# Patient Record
Sex: FEMALE | NOT HISPANIC OR LATINO | URBAN - METROPOLITAN AREA
[De-identification: names, ages, dates, MRNs, and addresses within clinical notes are randomized per-mention and may not be internally consistent; named-entity substitution may affect disease eponyms.]

---

## 2023-02-16 ENCOUNTER — EMERGENCY (EMERGENCY)
Facility: HOSPITAL | Age: 73
LOS: 1 days | Discharge: HOME | End: 2023-02-16
Attending: EMERGENCY MEDICINE
Payer: MEDICARE

## 2023-02-16 VITALS
DIASTOLIC BLOOD PRESSURE: 78 MMHG | RESPIRATION RATE: 19 BRPM | OXYGEN SATURATION: 96 % | SYSTOLIC BLOOD PRESSURE: 112 MMHG | HEART RATE: 114 BPM | TEMPERATURE: 99 F | WEIGHT: 139.99 LBS | HEIGHT: 63 IN

## 2023-02-16 VITALS
SYSTOLIC BLOOD PRESSURE: 99 MMHG | DIASTOLIC BLOOD PRESSURE: 71 MMHG | RESPIRATION RATE: 17 BRPM | OXYGEN SATURATION: 95 % | HEART RATE: 89 BPM

## 2023-02-16 LAB
ALBUMIN SERPL ELPH-MCNC: 3 G/DL — LOW (ref 3.3–5)
ALP SERPL-CCNC: 84 U/L — SIGNIFICANT CHANGE UP (ref 40–120)
ALT FLD-CCNC: 9 U/L — LOW (ref 10–45)
ANION GAP SERPL CALC-SCNC: 12 MMOL/L — SIGNIFICANT CHANGE UP (ref 5–17)
AST SERPL-CCNC: 15 U/L — SIGNIFICANT CHANGE UP (ref 10–40)
BASOPHILS # BLD AUTO: 0.08 K/UL — SIGNIFICANT CHANGE UP (ref 0–0.2)
BASOPHILS NFR BLD AUTO: 0.5 % — SIGNIFICANT CHANGE UP (ref 0–2)
BILIRUB SERPL-MCNC: 0.3 MG/DL — SIGNIFICANT CHANGE UP (ref 0.2–1.2)
BUN SERPL-MCNC: 14 MG/DL — SIGNIFICANT CHANGE UP (ref 7–23)
CALCIUM SERPL-MCNC: 9 MG/DL — SIGNIFICANT CHANGE UP (ref 8.4–10.5)
CHLORIDE SERPL-SCNC: 101 MMOL/L — SIGNIFICANT CHANGE UP (ref 96–108)
CO2 SERPL-SCNC: 25 MMOL/L — SIGNIFICANT CHANGE UP (ref 22–31)
CREAT SERPL-MCNC: 0.74 MG/DL — SIGNIFICANT CHANGE UP (ref 0.5–1.3)
EGFR: 85 ML/MIN/1.73M2 — SIGNIFICANT CHANGE UP
EOSINOPHIL # BLD AUTO: 0.22 K/UL — SIGNIFICANT CHANGE UP (ref 0–0.5)
EOSINOPHIL NFR BLD AUTO: 1.5 % — SIGNIFICANT CHANGE UP (ref 0–6)
FLUAV AG NPH QL: SIGNIFICANT CHANGE UP
FLUBV AG NPH QL: SIGNIFICANT CHANGE UP
GLUCOSE SERPL-MCNC: 113 MG/DL — HIGH (ref 70–99)
HCT VFR BLD CALC: 40.6 % — SIGNIFICANT CHANGE UP (ref 34.5–45)
HGB BLD-MCNC: 12.7 G/DL — SIGNIFICANT CHANGE UP (ref 11.5–15.5)
IMM GRANULOCYTES NFR BLD AUTO: 0.9 % — SIGNIFICANT CHANGE UP (ref 0–0.9)
LYMPHOCYTES # BLD AUTO: 1.05 K/UL — SIGNIFICANT CHANGE UP (ref 1–3.3)
LYMPHOCYTES # BLD AUTO: 7.2 % — LOW (ref 13–44)
MCHC RBC-ENTMCNC: 26.7 PG — LOW (ref 27–34)
MCHC RBC-ENTMCNC: 31.3 GM/DL — LOW (ref 32–36)
MCV RBC AUTO: 85.5 FL — SIGNIFICANT CHANGE UP (ref 80–100)
MONOCYTES # BLD AUTO: 1.18 K/UL — HIGH (ref 0–0.9)
MONOCYTES NFR BLD AUTO: 8 % — SIGNIFICANT CHANGE UP (ref 2–14)
NEUTROPHILS # BLD AUTO: 12.02 K/UL — HIGH (ref 1.8–7.4)
NEUTROPHILS NFR BLD AUTO: 81.9 % — HIGH (ref 43–77)
NRBC # BLD: 0 /100 WBCS — SIGNIFICANT CHANGE UP (ref 0–0)
PLATELET # BLD AUTO: 591 K/UL — HIGH (ref 150–400)
POTASSIUM SERPL-MCNC: 4.3 MMOL/L — SIGNIFICANT CHANGE UP (ref 3.5–5.3)
POTASSIUM SERPL-SCNC: 4.3 MMOL/L — SIGNIFICANT CHANGE UP (ref 3.5–5.3)
PROT SERPL-MCNC: 7.6 G/DL — SIGNIFICANT CHANGE UP (ref 6–8.3)
RBC # BLD: 4.75 M/UL — SIGNIFICANT CHANGE UP (ref 3.8–5.2)
RBC # FLD: 13.2 % — SIGNIFICANT CHANGE UP (ref 10.3–14.5)
RSV RNA NPH QL NAA+NON-PROBE: SIGNIFICANT CHANGE UP
SARS-COV-2 RNA SPEC QL NAA+PROBE: SIGNIFICANT CHANGE UP
SODIUM SERPL-SCNC: 138 MMOL/L — SIGNIFICANT CHANGE UP (ref 135–145)
WBC # BLD: 14.68 K/UL — HIGH (ref 3.8–10.5)
WBC # FLD AUTO: 14.68 K/UL — HIGH (ref 3.8–10.5)

## 2023-02-16 PROCEDURE — 71046 X-RAY EXAM CHEST 2 VIEWS: CPT | Mod: 26

## 2023-02-16 PROCEDURE — 99284 EMERGENCY DEPT VISIT MOD MDM: CPT | Mod: 25

## 2023-02-16 PROCEDURE — 85025 COMPLETE CBC W/AUTO DIFF WBC: CPT

## 2023-02-16 PROCEDURE — 36415 COLL VENOUS BLD VENIPUNCTURE: CPT

## 2023-02-16 PROCEDURE — 87637 SARSCOV2&INF A&B&RSV AMP PRB: CPT

## 2023-02-16 PROCEDURE — 80053 COMPREHEN METABOLIC PANEL: CPT

## 2023-02-16 PROCEDURE — 71046 X-RAY EXAM CHEST 2 VIEWS: CPT

## 2023-02-16 PROCEDURE — 99284 EMERGENCY DEPT VISIT MOD MDM: CPT

## 2023-02-16 RX ORDER — IBUPROFEN 200 MG
400 TABLET ORAL ONCE
Refills: 0 | Status: COMPLETED | OUTPATIENT
Start: 2023-02-16 | End: 2023-02-16

## 2023-02-16 RX ORDER — AZITHROMYCIN 500 MG/1
1 TABLET, FILM COATED ORAL
Qty: 4 | Refills: 0
Start: 2023-02-16 | End: 2023-02-19

## 2023-02-16 RX ORDER — ONDANSETRON 8 MG/1
4 TABLET, FILM COATED ORAL ONCE
Refills: 0 | Status: DISCONTINUED | OUTPATIENT
Start: 2023-02-16 | End: 2023-02-20

## 2023-02-16 RX ORDER — AZITHROMYCIN 500 MG/1
500 TABLET, FILM COATED ORAL ONCE
Refills: 0 | Status: COMPLETED | OUTPATIENT
Start: 2023-02-16 | End: 2023-02-16

## 2023-02-16 RX ORDER — ACETAMINOPHEN 500 MG
975 TABLET ORAL ONCE
Refills: 0 | Status: COMPLETED | OUTPATIENT
Start: 2023-02-16 | End: 2023-02-16

## 2023-02-16 RX ADMIN — Medication 975 MILLIGRAM(S): at 19:43

## 2023-02-16 RX ADMIN — Medication 1 TABLET(S): at 21:28

## 2023-02-16 RX ADMIN — Medication 400 MILLIGRAM(S): at 17:29

## 2023-02-16 RX ADMIN — Medication 400 MILLIGRAM(S): at 19:43

## 2023-02-16 RX ADMIN — Medication 975 MILLIGRAM(S): at 17:29

## 2023-02-16 RX ADMIN — AZITHROMYCIN 500 MILLIGRAM(S): 500 TABLET, FILM COATED ORAL at 21:29

## 2023-02-16 NOTE — ED PROVIDER NOTE - PATIENT PORTAL LINK FT
You can access the FollowMyHealth Patient Portal offered by [facility] by registering at the following website: [email or web address]. By joining [employer]’s FollowMyHealth portal, you will also be able to view your health information using other applications (apps) compatible with our system.

## 2023-02-16 NOTE — ED PROVIDER NOTE - OBJECTIVE STATEMENT
This is a 72-year-old female who is coming in with cough and fever To 101. Productive phlegm with chest congestion.  No chest pain.  No pleuritic symptoms.  No abdominal pain.  She feels like his upper respiratory tract infection but nothing is helping.

## 2023-02-16 NOTE — ED PROVIDER NOTE - NSFOLLOWUPINSTRUCTIONS_ED_ALL_ED_FT
IMPORTANT INSTRUCTIONS FROM Dr. LÓPEZ:    Please follow up with your personal medical doctor in 24-48 hours.   Bring results from today to your visit.    If you were advised to take any medications - be sure to review the package insert.    If your symptoms change, get worse or if you have any new symptoms, come to the ER right away.  If you have any questions, call the ER at the phone number on this page.

## 2023-02-16 NOTE — ED PROVIDER NOTE - CLINICAL SUMMARY MEDICAL DECISION MAKING FREE TEXT BOX
Chest x-ray is consistent with pneumonia On the right-hand side at the heart border to my independent interpretation.  Basic blood work with mild leukocytosis.  Patient has a low curb 65 score and I discussed with her inpatient versus outpatient management of her pneumonia.  Patient strongly has a preference against being admitted.  Antibiotics prescribed with first dose in the ER.  Patient vital signs rechecked and improved. I discussed with the patient return precautions and expectations for her illness.

## 2023-02-16 NOTE — ED PROVIDER NOTE - RAPID ASSESSMENT
Patient presents with chief complaint of 3 weeks of cough without phlegm, Tmax 101.9 three days ago. Patient did not take anything prior to arrival. No chest pain. Patient presents with chief complaint of 3 weeks of cough without phlegm, Tmax 101.9 three days ago. Patient did not take anything prior to arrival. No chest pain.  Carlton Welch MD, FACEP: I saw the patient waiting area; a brief history was taken and a thorough physical exam was not performed as there is no physical exam room available.  The patient will be seen and further worked up in the main emergency department and their care will be completed by the main emergency department team.  I was not involved in this patient's care after the QDOC process, and unless otherwise noted in the ED provider note, was not involved in their care during their ED course. Receiving team will follow up on labs, analgesia, any clinical imaging, reassess and disposition as clinically indicated, all decisions regarding the progression of care will be made at their discretion.

## 2023-02-16 NOTE — ED ADULT NURSE REASSESSMENT NOTE - NS ED NURSE REASSESS COMMENT FT1
Received patient from Alma RN, patient at AxOx4 mental status, able to make needs known, NAD, VSS, patient agreeable to plan of care, pending dispo. Pt resting in stretcher, denying any symptoms at this time, well appearing. Pt requesting food.

## 2023-02-16 NOTE — ED ADULT NURSE NOTE - OBJECTIVE STATEMENT
1828 pt 72yf aox4 c/o fever /cough/chills x 12 days states unable to get an early appt w/ dr julio as of this time, denies pmhx, able to verbalize concerns, pending eval

## 2023-02-16 NOTE — ED PROVIDER NOTE - PSYCHIATRIC, MLM
Alert and oriented to person, place, time/situation. normal mood and affect. no apparent risk to self or others. Nosebleeds Normal Treatment: I explained this is common when taking isotretinoin. I recommended saline mist in each nostril multiple times a day. If this worsens they will contact us.

## 2023-03-31 ENCOUNTER — INPATIENT (INPATIENT)
Facility: HOSPITAL | Age: 73
LOS: 12 days | Discharge: NURSING, REHAB OR LONG-TERM CARE | DRG: 871 | End: 2023-04-13
Attending: STUDENT IN AN ORGANIZED HEALTH CARE EDUCATION/TRAINING PROGRAM | Admitting: STUDENT IN AN ORGANIZED HEALTH CARE EDUCATION/TRAINING PROGRAM
Payer: MEDICARE

## 2023-03-31 VITALS
DIASTOLIC BLOOD PRESSURE: 64 MMHG | RESPIRATION RATE: 18 BRPM | HEART RATE: 130 BPM | HEIGHT: 63 IN | OXYGEN SATURATION: 94 % | WEIGHT: 134.92 LBS | TEMPERATURE: 98 F | SYSTOLIC BLOOD PRESSURE: 93 MMHG

## 2023-03-31 DIAGNOSIS — A41.9 SEPSIS, UNSPECIFIED ORGANISM: ICD-10-CM

## 2023-03-31 DIAGNOSIS — R77.8 OTHER SPECIFIED ABNORMALITIES OF PLASMA PROTEINS: ICD-10-CM

## 2023-03-31 DIAGNOSIS — J15.6 PNEUMONIA DUE TO OTHER GRAM-NEGATIVE BACTERIA: ICD-10-CM

## 2023-03-31 DIAGNOSIS — J18.9 PNEUMONIA, UNSPECIFIED ORGANISM: ICD-10-CM

## 2023-03-31 DIAGNOSIS — D75.839 THROMBOCYTOSIS, UNSPECIFIED: ICD-10-CM

## 2023-03-31 DIAGNOSIS — D64.9 ANEMIA, UNSPECIFIED: ICD-10-CM

## 2023-03-31 DIAGNOSIS — N17.9 ACUTE KIDNEY FAILURE, UNSPECIFIED: ICD-10-CM

## 2023-03-31 DIAGNOSIS — Z29.9 ENCOUNTER FOR PROPHYLACTIC MEASURES, UNSPECIFIED: ICD-10-CM

## 2023-03-31 PROBLEM — Z78.9 OTHER SPECIFIED HEALTH STATUS: Chronic | Status: ACTIVE | Noted: 2023-02-16

## 2023-03-31 LAB
ALBUMIN SERPL ELPH-MCNC: 2.4 G/DL — LOW (ref 3.3–5)
ALP SERPL-CCNC: 82 U/L — SIGNIFICANT CHANGE UP (ref 40–120)
ALT FLD-CCNC: 10 U/L — SIGNIFICANT CHANGE UP (ref 10–45)
APPEARANCE UR: CLEAR — SIGNIFICANT CHANGE UP
APTT BLD: 28.3 SEC — SIGNIFICANT CHANGE UP (ref 27.5–35.5)
AST SERPL-CCNC: 21 U/L — SIGNIFICANT CHANGE UP (ref 10–40)
BACTERIA # UR AUTO: NEGATIVE — SIGNIFICANT CHANGE UP
BASE EXCESS BLDV CALC-SCNC: 2.4 MMOL/L — SIGNIFICANT CHANGE UP (ref -2–3)
BASOPHILS # BLD AUTO: 0.07 K/UL — SIGNIFICANT CHANGE UP (ref 0–0.2)
BASOPHILS NFR BLD AUTO: 0.4 % — SIGNIFICANT CHANGE UP (ref 0–2)
BILIRUB SERPL-MCNC: 0.5 MG/DL — SIGNIFICANT CHANGE UP (ref 0.2–1.2)
BILIRUB UR-MCNC: NEGATIVE — SIGNIFICANT CHANGE UP
BUN SERPL-MCNC: 28 MG/DL — HIGH (ref 7–23)
CA-I SERPL-SCNC: 1.24 MMOL/L — SIGNIFICANT CHANGE UP (ref 1.15–1.33)
CALCIUM SERPL-MCNC: 9.2 MG/DL — SIGNIFICANT CHANGE UP (ref 8.4–10.5)
CHLORIDE BLDV-SCNC: 104 MMOL/L — SIGNIFICANT CHANGE UP (ref 96–108)
CHLORIDE SERPL-SCNC: 101 MMOL/L — SIGNIFICANT CHANGE UP (ref 96–108)
CO2 BLDV-SCNC: 28 MMOL/L — HIGH (ref 22–26)
CO2 SERPL-SCNC: 25 MMOL/L — SIGNIFICANT CHANGE UP (ref 22–31)
COLOR SPEC: YELLOW — SIGNIFICANT CHANGE UP
CREAT SERPL-MCNC: 1.58 MG/DL — HIGH (ref 0.5–1.3)
DIFF PNL FLD: ABNORMAL
EGFR: 34 ML/MIN/1.73M2 — LOW
EOSINOPHIL # BLD AUTO: 0.11 K/UL — SIGNIFICANT CHANGE UP (ref 0–0.5)
EOSINOPHIL NFR BLD AUTO: 0.6 % — SIGNIFICANT CHANGE UP (ref 0–6)
EPI CELLS # UR: 3 /HPF — SIGNIFICANT CHANGE UP
GAS PNL BLDV: 134 MMOL/L — LOW (ref 136–145)
GAS PNL BLDV: SIGNIFICANT CHANGE UP
GAS PNL BLDV: SIGNIFICANT CHANGE UP
GLUCOSE BLDV-MCNC: 147 MG/DL — HIGH (ref 70–99)
GLUCOSE SERPL-MCNC: 154 MG/DL — HIGH (ref 70–99)
GLUCOSE UR QL: NEGATIVE — SIGNIFICANT CHANGE UP
HCO3 BLDV-SCNC: 27 MMOL/L — SIGNIFICANT CHANGE UP (ref 22–29)
HCT VFR BLD CALC: 29.3 % — LOW (ref 34.5–45)
HCT VFR BLDA CALC: 29 % — LOW (ref 34.5–46.5)
HGB BLD CALC-MCNC: 9.6 G/DL — LOW (ref 11.7–16.1)
HGB BLD-MCNC: 9 G/DL — LOW (ref 11.5–15.5)
HYALINE CASTS # UR AUTO: 1 /LPF — SIGNIFICANT CHANGE UP (ref 0–2)
IMM GRANULOCYTES NFR BLD AUTO: 1.5 % — HIGH (ref 0–0.9)
INR BLD: 1.27 RATIO — HIGH (ref 0.88–1.16)
KETONES UR-MCNC: NEGATIVE — SIGNIFICANT CHANGE UP
LACTATE BLDV-MCNC: 2.1 MMOL/L — HIGH (ref 0.5–2)
LACTATE SERPL-SCNC: 1.2 MMOL/L — SIGNIFICANT CHANGE UP (ref 0.5–2)
LACTATE SERPL-SCNC: 1.8 MMOL/L — SIGNIFICANT CHANGE UP (ref 0.5–2)
LEUKOCYTE ESTERASE UR-ACNC: NEGATIVE — SIGNIFICANT CHANGE UP
LYMPHOCYTES # BLD AUTO: 0.97 K/UL — LOW (ref 1–3.3)
LYMPHOCYTES # BLD AUTO: 5.3 % — LOW (ref 13–44)
MCHC RBC-ENTMCNC: 24.3 PG — LOW (ref 27–34)
MCHC RBC-ENTMCNC: 30.7 GM/DL — LOW (ref 32–36)
MCV RBC AUTO: 79.2 FL — LOW (ref 80–100)
MONOCYTES # BLD AUTO: 1.38 K/UL — HIGH (ref 0–0.9)
MONOCYTES NFR BLD AUTO: 7.5 % — SIGNIFICANT CHANGE UP (ref 2–14)
NEUTROPHILS # BLD AUTO: 15.5 K/UL — HIGH (ref 1.8–7.4)
NEUTROPHILS NFR BLD AUTO: 84.7 % — HIGH (ref 43–77)
NITRITE UR-MCNC: NEGATIVE — SIGNIFICANT CHANGE UP
NRBC # BLD: 0 /100 WBCS — SIGNIFICANT CHANGE UP (ref 0–0)
PCO2 BLDV: 42 MMHG — SIGNIFICANT CHANGE UP (ref 39–42)
PH BLDV: 7.42 — SIGNIFICANT CHANGE UP (ref 7.32–7.43)
PH UR: 7 — SIGNIFICANT CHANGE UP (ref 5–8)
PLATELET # BLD AUTO: 663 K/UL — HIGH (ref 150–400)
PO2 BLDV: 31 MMHG — SIGNIFICANT CHANGE UP (ref 25–45)
POTASSIUM BLDV-SCNC: 4.8 MMOL/L — SIGNIFICANT CHANGE UP (ref 3.5–5.1)
POTASSIUM SERPL-MCNC: 4.8 MMOL/L — SIGNIFICANT CHANGE UP (ref 3.5–5.3)
POTASSIUM SERPL-SCNC: 4.8 MMOL/L — SIGNIFICANT CHANGE UP (ref 3.5–5.3)
PROT SERPL-MCNC: 6.8 G/DL — SIGNIFICANT CHANGE UP (ref 6–8.3)
PROT UR-MCNC: ABNORMAL
PROTHROM AB SERPL-ACNC: 14.6 SEC — HIGH (ref 10.5–13.4)
RAPID RVP RESULT: SIGNIFICANT CHANGE UP
RBC # BLD: 3.7 M/UL — LOW (ref 3.8–5.2)
RBC # FLD: 15.4 % — HIGH (ref 10.3–14.5)
RBC CASTS # UR COMP ASSIST: 8 /HPF — HIGH (ref 0–4)
SAO2 % BLDV: 48.7 % — LOW (ref 67–88)
SARS-COV-2 RNA SPEC QL NAA+PROBE: SIGNIFICANT CHANGE UP
SODIUM SERPL-SCNC: 137 MMOL/L — SIGNIFICANT CHANGE UP (ref 135–145)
SP GR SPEC: 1.01 — SIGNIFICANT CHANGE UP (ref 1.01–1.02)
TROPONIN T, HIGH SENSITIVITY RESULT: 81 NG/L — HIGH (ref 0–51)
TROPONIN T, HIGH SENSITIVITY RESULT: 95 NG/L — HIGH (ref 0–51)
UROBILINOGEN FLD QL: NEGATIVE — SIGNIFICANT CHANGE UP
WBC # BLD: 18.31 K/UL — HIGH (ref 3.8–10.5)
WBC # FLD AUTO: 18.31 K/UL — HIGH (ref 3.8–10.5)
WBC UR QL: 12 /HPF — HIGH (ref 0–5)

## 2023-03-31 PROCEDURE — 71045 X-RAY EXAM CHEST 1 VIEW: CPT | Mod: 26

## 2023-03-31 PROCEDURE — 99223 1ST HOSP IP/OBS HIGH 75: CPT

## 2023-03-31 PROCEDURE — 71260 CT THORAX DX C+: CPT | Mod: 26,MG

## 2023-03-31 PROCEDURE — G1004: CPT

## 2023-03-31 PROCEDURE — 99285 EMERGENCY DEPT VISIT HI MDM: CPT | Mod: CS

## 2023-03-31 RX ORDER — ENOXAPARIN SODIUM 100 MG/ML
40 INJECTION SUBCUTANEOUS EVERY 24 HOURS
Refills: 0 | Status: DISCONTINUED | OUTPATIENT
Start: 2023-03-31 | End: 2023-04-01

## 2023-03-31 RX ORDER — CHOLECALCIFEROL (VITAMIN D3) 125 MCG
1 CAPSULE ORAL
Refills: 0 | DISCHARGE

## 2023-03-31 RX ORDER — ACETAMINOPHEN 500 MG
650 TABLET ORAL EVERY 6 HOURS
Refills: 0 | Status: DISCONTINUED | OUTPATIENT
Start: 2023-03-31 | End: 2023-04-13

## 2023-03-31 RX ORDER — CEFTRIAXONE 500 MG/1
1000 INJECTION, POWDER, FOR SOLUTION INTRAMUSCULAR; INTRAVENOUS ONCE
Refills: 0 | Status: COMPLETED | OUTPATIENT
Start: 2023-03-31 | End: 2023-03-31

## 2023-03-31 RX ORDER — SODIUM CHLORIDE 9 MG/ML
1900 INJECTION, SOLUTION INTRAVENOUS ONCE
Refills: 0 | Status: COMPLETED | OUTPATIENT
Start: 2023-03-31 | End: 2023-03-31

## 2023-03-31 RX ORDER — PIPERACILLIN AND TAZOBACTAM 4; .5 G/20ML; G/20ML
3.38 INJECTION, POWDER, LYOPHILIZED, FOR SOLUTION INTRAVENOUS EVERY 8 HOURS
Refills: 0 | Status: COMPLETED | OUTPATIENT
Start: 2023-04-01 | End: 2023-04-06

## 2023-03-31 RX ORDER — PIPERACILLIN AND TAZOBACTAM 4; .5 G/20ML; G/20ML
3.38 INJECTION, POWDER, LYOPHILIZED, FOR SOLUTION INTRAVENOUS ONCE
Refills: 0 | Status: COMPLETED | OUTPATIENT
Start: 2023-03-31 | End: 2023-03-31

## 2023-03-31 RX ORDER — SODIUM CHLORIDE 9 MG/ML
1000 INJECTION, SOLUTION INTRAVENOUS
Refills: 0 | Status: DISCONTINUED | OUTPATIENT
Start: 2023-03-31 | End: 2023-04-02

## 2023-03-31 RX ADMIN — SODIUM CHLORIDE 1900 MILLILITER(S): 9 INJECTION, SOLUTION INTRAVENOUS at 03:55

## 2023-03-31 RX ADMIN — ENOXAPARIN SODIUM 40 MILLIGRAM(S): 100 INJECTION SUBCUTANEOUS at 17:55

## 2023-03-31 RX ADMIN — SODIUM CHLORIDE 60 MILLILITER(S): 9 INJECTION, SOLUTION INTRAVENOUS at 18:16

## 2023-03-31 RX ADMIN — CEFTRIAXONE 100 MILLIGRAM(S): 500 INJECTION, POWDER, FOR SOLUTION INTRAMUSCULAR; INTRAVENOUS at 04:50

## 2023-03-31 RX ADMIN — PIPERACILLIN AND TAZOBACTAM 25 GRAM(S): 4; .5 INJECTION, POWDER, LYOPHILIZED, FOR SOLUTION INTRAVENOUS at 20:52

## 2023-03-31 RX ADMIN — PIPERACILLIN AND TAZOBACTAM 200 GRAM(S): 4; .5 INJECTION, POWDER, LYOPHILIZED, FOR SOLUTION INTRAVENOUS at 17:55

## 2023-03-31 NOTE — H&P ADULT - CONVERSATION DETAILS
Pt would designate her domestic partner Robert Alex as her HCP  She also says she would like to be full code

## 2023-03-31 NOTE — H&P ADULT - PROBLEM SELECTOR PLAN 5
No hx of bleeding, but previous hgb 12.7 last month  Check iron studies, reticulocyte count, B12 and folic acid (doesn't eat meat but supplements)

## 2023-03-31 NOTE — ED PROVIDER NOTE - CARE PLAN
Principal Discharge DX:	RLL pneumonia   1 Principal Discharge DX:	RLL pneumonia  Secondary Diagnosis:	MINERVA (acute kidney injury)

## 2023-03-31 NOTE — H&P ADULT - NSHPPHYSICALEXAM_GEN_ALL_CORE
ICU Vital Signs Last 24 Hrs  T(C): 36.8 (31 Mar 2023 15:38), Max: 37.1 (31 Mar 2023 07:18)  T(F): 98.2 (31 Mar 2023 15:38), Max: 98.7 (31 Mar 2023 07:18)  HR: 96 (31 Mar 2023 15:38) (84 - 130)  BP: 102/71 (31 Mar 2023 15:38) (93/64 - 123/81)  BP(mean): 93 (31 Mar 2023 07:18) (80 - 93)  ABP: --  ABP(mean): --  RR: 18 (31 Mar 2023 15:38) (17 - 19)  SpO2: 97% (31 Mar 2023 15:38) (94% - 100%)    O2 Parameters below as of 31 Mar 2023 15:38  Patient On (Oxygen Delivery Method): room air

## 2023-03-31 NOTE — H&P ADULT - PROBLEM SELECTOR PLAN 1
2/2 PNA, repeat lactate cleared, cultures pending  Continue abx for resistant organism given abx use within last 90 days  Sputum culture

## 2023-03-31 NOTE — ED PROVIDER NOTE - ATTENDING CONTRIBUTION TO CARE
I, Adonis Ospina, performed a history and physical exam of the patient and discussed their management with the resident, student, and/or fellow. I reviewed the note and agree with the documented findings and plan of care. I was present and available for all procedures.

## 2023-03-31 NOTE — ED ADULT TRIAGE NOTE - CHIEF COMPLAINT QUOTE
patient diagnosed with pneumonia 6 weeks ago, patient still endorsing cough, fevers, chills, weakness, fatigue, and decreased po intake

## 2023-03-31 NOTE — ED PROVIDER NOTE - CLINICAL SUMMARY MEDICAL DECISION MAKING FREE TEXT BOX
Jose C, PGY2 - 72-year-old woman with prior pneumonia, now post antibiotics, presenting due to acute worsening of symptoms most prominently weakness. Patient was tachycardic and hypotensive at triage, although she says that her BP is low at baseline. Sepsis labs, chest CT, reassess. concern for persistent pneumonia versus malignancy vs necrosis. *The above represents an initial assessment/impression. Please refer to progress notes for potential changes in patient clinical course* Jose C, PGY2 - 72-year-old woman with prior pneumonia, now post antibiotics, presenting due to acute worsening of symptoms most prominently weakness. Patient was tachycardic and hypotensive at triage, although she says that her BP is low at baseline. Sepsis labs, chest CT, reassess. concern for persistent pneumonia versus malignancy vs necrosis. *The above represents an initial assessment/impression. Please refer to progress notes for potential changes in patient clinical course*    Adonis Ospina MD (Attending Physician): 72 year old female with recent diagnosis of pneumonia presents for intermittent fevers, weakness, cough x 6 weeks. Pt here prior for similar complaints, diagnosed with pneumonia and discharged with abx. Since then, she has been increasingly more fatigue and not getting up. Denies cp, n/v/d, abd pain. Reports wet cough with sputum as well as SOB. States she has "low blood pressure" but patient was in the 116s systolic on last visit. No other known diagnoses. On exam, cooperative, sox4. CVS tachycardic. Lungs: CTAB. Abd soft and nontender. MSK: No leg edema or erythema. Afebrile. Differentials include but are not limited to: viral illness, effusion, empyema, viral URI, pneumonia, pulmonary embolism. plan for cardiac workup, CT chest and will admit. patient hypotensive, tachycardic here and sepsis workup initiated with fluids.

## 2023-03-31 NOTE — H&P ADULT - PROBLEM SELECTOR PLAN 3
No hx of this  Check peripheral smear in am if persists after hydration  Should improve with hydration, doubt primary thrombocytosis pt has no previous hx of this

## 2023-03-31 NOTE — ED ADULT NURSE NOTE - ED STAT RN HANDOFF DETAILS
Assumed pt care from previous RNWanda.  Pt alert and oriented, stretcher side rails up, and in lowest locked position.  Safety maintained.

## 2023-03-31 NOTE — ED PROVIDER NOTE - NS ED ROS FT
GENERAL: No fever, no chills  EYES: No change in vision  HEENT: No trouble swallowing or speaking  CARDIAC: No chest pain  PULMONARY: +cough, no SOB  GI: No abdominal pain, no nausea, no vomiting, no diarrhea, no constipation  : No changes in urination  SKIN: No rashes  NEURO: No headache, no numbness  MSK: No joint pain  Otherwise as HPI or negative.

## 2023-03-31 NOTE — ED ADULT NURSE NOTE - OBJECTIVE STATEMENT
72y Female AOx4 with no PMH presents to the ED c/o worsening weakness. Pt states she was dx pneumonia x6 weeks ago, was prescribed Augmentin and Azithromycin, completed both courses. Endorsed slight improvement in cough. Now presenting for worsening cough, weakness and dyspnea on exertion. Reports difficulty ambulating, independent at baseline. Placed on cardiac monitor - sinus tachycardia. Denies N/V, fever/chills, SOB, chest pain. Spontaneous/unlabored respirations, speaking in full sentences. Side rails up, bed in lowest position, oriented to call bell, safety maintained.

## 2023-03-31 NOTE — ED PROVIDER NOTE - OBJECTIVE STATEMENT
72-year-old woman with PMH of pneumonia diagnosed 6 weeks ago, presenting due to acute worsening of weakness.  Patient states that since her pneumonia diagnosis and subsequent augmentin and azithromax, she has felt an improvement in cough, chills, weakness, however has not felt back to baseline.  Took antibiotics for a total of 5 days. Over the last couple of days patient has had worsening of her weakness, states that she needs to lean onto a person or furniture for her to ambulate.  Denies chest pain, shortness of breath.

## 2023-03-31 NOTE — ED PROVIDER NOTE - PHYSICAL EXAMINATION
Gen: NAD, AOx3, able to make needs known, non-toxic  Head: NCAT  HEENT: EOMI, normal conjunctiva  Lung: CTAB, no respiratory distress, no wheezes/rhonchi/rales B/L, speaking in full sentences  CV: RRR, +murmur, pulses bilaterally   Abd: soft, NTND, no guarding, no CVA tenderness  MSK: no visible bony deformities  Neuro: No focal sensory or motor deficits  Skin: Warm, well perfused, no rash  Psych: normal affect

## 2023-03-31 NOTE — H&P ADULT - NSHPLABSRESULTS_GEN_ALL_CORE
9.0    18.31 )-----------( 663      ( 31 Mar 2023 03:24 )             29.3     31 Mar 2023 03:24    137    |  101    |  28     ----------------------------<  154    4.8     |  25     |  1.58     Ca    9.2        31 Mar 2023 03:24    TPro  6.8    /  Alb  2.4    /  TBili  0.5    /  DBili  x      /  AST  21     /  ALT  10     /  AlkPhos  82     31 Mar 2023 03:24    LIVER FUNCTIONS - ( 31 Mar 2023 03:24 )  Alb: 2.4 g/dL / Pro: 6.8 g/dL / ALK PHOS: 82 U/L / ALT: 10 U/L / AST: 21 U/L / GGT: x           PT/INR - ( 31 Mar 2023 03:24 )   PT: 14.6 sec;   INR: 1.27 ratio         PTT - ( 31 Mar 2023 03:24 )  PTT:28.3 sec  CAPILLARY BLOOD GLUCOSE            Urinalysis Basic - ( 31 Mar 2023 05:31 )    Color: Yellow / Appearance: Clear / S.012 / pH: x  Gluc: x / Ketone: Negative  / Bili: Negative / Urobili: Negative   Blood: x / Protein: 30 mg/dL / Nitrite: Negative   Leuk Esterase: Negative / RBC: 8 /hpf / WBC 12 /HPF   Sq Epi: x / Non Sq Epi: 3 /hpf / Bacteria: Negative

## 2023-03-31 NOTE — H&P ADULT - NSICDXNOPASTMEDICALHX_GEN_ALL_CORE
<-- Click to add NO pertinent Past Medical History
Alert and oriented to person, place and time, memory intact, behavior appropriate to situation, PERRL.

## 2023-03-31 NOTE — ED ADULT TRIAGE NOTE - BSA (M2)
Detail Level: Zone Plan: Location: Chest and Back\\nTreatment: advised patient if bothersome can be zapped 1.64

## 2023-03-31 NOTE — H&P ADULT - PROBLEM SELECTOR PLAN 4
Downtrended but delta change is 15  Pt also complaining of SOB and weakness so will check TTE  EKG sinus tachycardia no ishemic changes per ED documentation, will repeat

## 2023-03-31 NOTE — H&P ADULT - HISTORY OF PRESENT ILLNESS
72F no significant past medical history except for recent pneumonia treated with PO abx.  She says that she was treated with Augmentin and Zithromax and abx finished several weeks ago. Since that time she has continued to feel weak, rarely getting out of bed or leaving the house and persistent chills predominately at night.  Her cough as continued, again moreso at night.  She also denies any history of anemia or noted any bleeding, no hx of thrombocytosis or renal disease.  She lives with a domestic partner of 20+ years.  Takes vitamins but no medications daily.  No chest pain.

## 2023-03-31 NOTE — ED PROVIDER NOTE - PROGRESS NOTE DETAILS
Jose C, PGY2 - cxr and ct chest consistent with pneumonia. troponin elevated in the context of new MINERVA, will send repeat, patient denying chest pain and EKG shows no ST elevations with reciprocal depressions. repeat troponin to be sent now. Hospitalist rosetta accepting patient for pneumonia.

## 2023-04-01 LAB
ANION GAP SERPL CALC-SCNC: 10 MMOL/L — SIGNIFICANT CHANGE UP (ref 5–17)
BASOPHILS # BLD AUTO: 0.28 K/UL — HIGH (ref 0–0.2)
BASOPHILS NFR BLD AUTO: 1.7 % — SIGNIFICANT CHANGE UP (ref 0–2)
BLD GP AB SCN SERPL QL: NEGATIVE — SIGNIFICANT CHANGE UP
BUN SERPL-MCNC: 23 MG/DL — SIGNIFICANT CHANGE UP (ref 7–23)
CALCIUM SERPL-MCNC: 8.2 MG/DL — LOW (ref 8.4–10.5)
CHLORIDE SERPL-SCNC: 103 MMOL/L — SIGNIFICANT CHANGE UP (ref 96–108)
CO2 SERPL-SCNC: 25 MMOL/L — SIGNIFICANT CHANGE UP (ref 22–31)
CREAT ?TM UR-MCNC: 82 MG/DL — SIGNIFICANT CHANGE UP
CREAT SERPL-MCNC: 1.76 MG/DL — HIGH (ref 0.5–1.3)
CULTURE RESULTS: SIGNIFICANT CHANGE UP
EGFR: 30 ML/MIN/1.73M2 — LOW
EOSINOPHIL # BLD AUTO: 0.15 K/UL — SIGNIFICANT CHANGE UP (ref 0–0.5)
EOSINOPHIL NFR BLD AUTO: 0.9 % — SIGNIFICANT CHANGE UP (ref 0–6)
FERRITIN SERPL-MCNC: 581 NG/ML — HIGH (ref 15–150)
FOLATE SERPL-MCNC: 8 NG/ML — SIGNIFICANT CHANGE UP
GLUCOSE SERPL-MCNC: 121 MG/DL — HIGH (ref 70–99)
HAPTOGLOB SERPL-MCNC: 322 MG/DL — HIGH (ref 34–200)
HCT VFR BLD CALC: 25.7 % — LOW (ref 34.5–45)
HCV AB S/CO SERPL IA: 0.1 S/CO — SIGNIFICANT CHANGE UP (ref 0–0.99)
HCV AB SERPL-IMP: SIGNIFICANT CHANGE UP
HGB BLD-MCNC: 7.9 G/DL — LOW (ref 11.5–15.5)
IRON SATN MFR SERPL: 10 % — LOW (ref 14–50)
IRON SATN MFR SERPL: 18 UG/DL — LOW (ref 30–160)
LDH SERPL L TO P-CCNC: 292 U/L — HIGH (ref 50–242)
LYMPHOCYTES # BLD AUTO: 1.68 K/UL — SIGNIFICANT CHANGE UP (ref 1–3.3)
LYMPHOCYTES # BLD AUTO: 10.4 % — LOW (ref 13–44)
MAGNESIUM SERPL-MCNC: 2 MG/DL — SIGNIFICANT CHANGE UP (ref 1.6–2.6)
MANUAL SMEAR VERIFICATION: SIGNIFICANT CHANGE UP
MCHC RBC-ENTMCNC: 24.5 PG — LOW (ref 27–34)
MCHC RBC-ENTMCNC: 30.7 GM/DL — LOW (ref 32–36)
MCV RBC AUTO: 79.6 FL — LOW (ref 80–100)
MONOCYTES # BLD AUTO: 0.99 K/UL — HIGH (ref 0–0.9)
MONOCYTES NFR BLD AUTO: 6.1 % — SIGNIFICANT CHANGE UP (ref 2–14)
NEUTROPHILS # BLD AUTO: 13.09 K/UL — HIGH (ref 1.8–7.4)
NEUTROPHILS NFR BLD AUTO: 80.9 % — HIGH (ref 43–77)
NRBC # BLD: 0 /100 WBCS — SIGNIFICANT CHANGE UP (ref 0–0)
PHOSPHATE SERPL-MCNC: 3.7 MG/DL — SIGNIFICANT CHANGE UP (ref 2.5–4.5)
PLAT MORPH BLD: NORMAL — SIGNIFICANT CHANGE UP
PLATELET # BLD AUTO: 653 K/UL — HIGH (ref 150–400)
POTASSIUM SERPL-MCNC: 4 MMOL/L — SIGNIFICANT CHANGE UP (ref 3.5–5.3)
POTASSIUM SERPL-SCNC: 4 MMOL/L — SIGNIFICANT CHANGE UP (ref 3.5–5.3)
RBC # BLD: 3.23 M/UL — LOW (ref 3.8–5.2)
RBC # BLD: 3.23 M/UL — LOW (ref 3.8–5.2)
RBC # FLD: 15.7 % — HIGH (ref 10.3–14.5)
RBC BLD AUTO: SIGNIFICANT CHANGE UP
RETICS #: 69.8 K/UL — SIGNIFICANT CHANGE UP (ref 25–125)
RETICS/RBC NFR: 2.2 % — SIGNIFICANT CHANGE UP (ref 0.5–2.5)
RH IG SCN BLD-IMP: POSITIVE — SIGNIFICANT CHANGE UP
SMUDGE CELLS # BLD: PRESENT — SIGNIFICANT CHANGE UP
SODIUM SERPL-SCNC: 138 MMOL/L — SIGNIFICANT CHANGE UP (ref 135–145)
SODIUM UR-SCNC: 66 MMOL/L — SIGNIFICANT CHANGE UP
SPECIMEN SOURCE: SIGNIFICANT CHANGE UP
TIBC SERPL-MCNC: 172 UG/DL — LOW (ref 220–430)
TRANSFERRIN SERPL-MCNC: 115 MG/DL — LOW (ref 200–360)
UIBC SERPL-MCNC: 154 UG/DL — SIGNIFICANT CHANGE UP (ref 110–370)
VIT B12 SERPL-MCNC: 749 PG/ML — SIGNIFICANT CHANGE UP (ref 232–1245)
WBC # BLD: 16.18 K/UL — HIGH (ref 3.8–10.5)
WBC # FLD AUTO: 16.18 K/UL — HIGH (ref 3.8–10.5)

## 2023-04-01 PROCEDURE — 76775 US EXAM ABDO BACK WALL LIM: CPT | Mod: 26

## 2023-04-01 PROCEDURE — 99233 SBSQ HOSP IP/OBS HIGH 50: CPT

## 2023-04-01 RX ORDER — FERROUS SULFATE 325(65) MG
325 TABLET ORAL DAILY
Refills: 0 | Status: DISCONTINUED | OUTPATIENT
Start: 2023-04-01 | End: 2023-04-13

## 2023-04-01 RX ORDER — IPRATROPIUM/ALBUTEROL SULFATE 18-103MCG
3 AEROSOL WITH ADAPTER (GRAM) INHALATION EVERY 6 HOURS
Refills: 0 | Status: DISCONTINUED | OUTPATIENT
Start: 2023-04-01 | End: 2023-04-09

## 2023-04-01 RX ADMIN — PIPERACILLIN AND TAZOBACTAM 25 GRAM(S): 4; .5 INJECTION, POWDER, LYOPHILIZED, FOR SOLUTION INTRAVENOUS at 22:40

## 2023-04-01 RX ADMIN — SODIUM CHLORIDE 60 MILLILITER(S): 9 INJECTION, SOLUTION INTRAVENOUS at 05:03

## 2023-04-01 RX ADMIN — PIPERACILLIN AND TAZOBACTAM 25 GRAM(S): 4; .5 INJECTION, POWDER, LYOPHILIZED, FOR SOLUTION INTRAVENOUS at 14:24

## 2023-04-01 RX ADMIN — Medication 325 MILLIGRAM(S): at 12:30

## 2023-04-01 RX ADMIN — PIPERACILLIN AND TAZOBACTAM 25 GRAM(S): 4; .5 INJECTION, POWDER, LYOPHILIZED, FOR SOLUTION INTRAVENOUS at 05:03

## 2023-04-01 RX ADMIN — Medication 3 MILLILITER(S): at 17:42

## 2023-04-01 RX ADMIN — Medication 3 MILLILITER(S): at 12:30

## 2023-04-01 RX ADMIN — Medication 650 MILLIGRAM(S): at 23:40

## 2023-04-01 NOTE — PROGRESS NOTE ADULT - PROBLEM SELECTOR PLAN 4
Downtrended but delta change is 15  Pt also complaining of SOB and weakness so will check TTE  EKG sinus tachycardia no ischemic changes

## 2023-04-01 NOTE — PROGRESS NOTE ADULT - SUBJECTIVE AND OBJECTIVE BOX
PROGRESS NOTE:   Abigail Lyles DO  Hospitalist  Pager [phone]  After 5pm/weekends or if no answer ext: [phone]      Patient is a 72y old  Female who presents with a chief complaint of Cough (31 Mar 2023 15:27)      SUBJECTIVE / OVERNIGHT EVENTS:  Cough better, anemia is worse but still not signs of bleeding    ADDITIONAL REVIEW OF SYSTEMS:  No fever or chills  No n/v/d    MEDICATIONS  (STANDING):  albuterol/ipratropium for Nebulization 3 milliLiter(s) Nebulizer every 6 hours  lactated ringers. 1000 milliLiter(s) (60 mL/Hr) IV Continuous <Continuous>  piperacillin/tazobactam IVPB.. 3.375 Gram(s) IV Intermittent every 8 hours    MEDICATIONS  (PRN):  acetaminophen     Tablet .. 650 milliGRAM(s) Oral every 6 hours PRN Temp greater or equal to 38C (100.4F), Mild Pain (1 - 3)      CAPILLARY BLOOD GLUCOSE        I&O's Summary    31 Mar 2023 07:01  -  2023 07:00  --------------------------------------------------------  IN: 720 mL / OUT: 0 mL / NET: 720 mL        PHYSICAL EXAM:  Vital Signs Last 24 Hrs  T(C): 37.2 (2023 09:07), Max: 37.4 (31 Mar 2023 22:57)  T(F): 99 (2023 09:07), Max: 99.3 (31 Mar 2023 22:57)  HR: 97 (2023 09:07) (96 - 100)  BP: 98/57 (2023 09:07) (98/57 - 116/77)  BP(mean): --  RR: 18 (2023 09:07) (18 - 18)  SpO2: 95% (2023 09:07) (95% - 99%)    Parameters below as of 2023 09:07  Patient On (Oxygen Delivery Method): room air        CONSTITUTIONAL: NAD, well-developed, non toxic  RESPIRATORY: Decreased BS on Right no significant wheezing  CARDIOVASCULAR: Regular rate and rhythm, normal S1 and S2, no murmur/rub/gallop; No lower extremity edema; Peripheral pulses are 2+ bilaterally  ABDOMEN: Nontender to palpation, normoactive bowel sounds, no rebound/guarding; No hepatosplenomegaly  MUSCLOSKELETAL: no clubbing or cyanosis of digits; no joint swelling or tenderness to palpation  PSYCH: A+O to person, place, and time; affect appropriate    LABS:                        7.9    16.18 )-----------( 653      ( 2023 07:11 )             25.7     04-01    138  |  103  |  23  ----------------------------<  121<H>  4.0   |  25  |  1.76<H>    Ca    8.2<L>      2023 07:13  Phos  3.7     04-  Mg     2.0     04-    TPro  6.8  /  Alb  2.4<L>  /  TBili  0.5  /  DBili  x   /  AST  21  /  ALT  10  /  AlkPhos  82  03-31    PT/INR - ( 31 Mar 2023 03:24 )   PT: 14.6 sec;   INR: 1.27 ratio         PTT - ( 31 Mar 2023 03:24 )  PTT:28.3 sec      Urinalysis Basic - ( 31 Mar 2023 05:31 )    Color: Yellow / Appearance: Clear / S.012 / pH: x  Gluc: x / Ketone: Negative  / Bili: Negative / Urobili: Negative   Blood: x / Protein: 30 mg/dL / Nitrite: Negative   Leuk Esterase: Negative / RBC: 8 /hpf / WBC 12 /HPF   Sq Epi: x / Non Sq Epi: 3 /hpf / Bacteria: Negative        Culture - Blood (collected 31 Mar 2023 03:10)  Source: .Blood Blood-Peripheral  Preliminary Report (2023 09:02):    No growth to date.    Culture - Blood (collected 31 Mar 2023 02:53)  Source: .Blood Blood-Peripheral  Preliminary Report (2023 09:02):    No growth to date.        RADIOLOGY & ADDITIONAL TESTS:  Results Reviewed:   Imaging Personally Reviewed:  Electrocardiogram Personally Reviewed:    COORDINATION OF CARE:  Care Discussed with Consultants/Other Providers [Y/N]:  Prior or Outpatient Records Reviewed [Y/N]:

## 2023-04-01 NOTE — PROGRESS NOTE ADULT - PROBLEM SELECTOR PLAN 6
Check urine lytes and bladder scan still pending, reordered  Renal US  If continues to increase will get renal consult

## 2023-04-01 NOTE — PROGRESS NOTE ADULT - PROBLEM SELECTOR PLAN 5
No hx of bleeding, but previous hgb 12.7 last month  Check iron studies, reticulocyte count, B12 and folic acid (doesn't eat meat but supplements)- pending  Check LDH/Hapto  Keep active t/s  transfuse < 7 discussed w/ pt   Added on peripheral smear may need heme consult

## 2023-04-02 LAB
ALBUMIN SERPL ELPH-MCNC: 2.2 G/DL — LOW (ref 3.3–5)
ALP SERPL-CCNC: 81 U/L — SIGNIFICANT CHANGE UP (ref 40–120)
ALT FLD-CCNC: 7 U/L — LOW (ref 10–45)
ANION GAP SERPL CALC-SCNC: 12 MMOL/L — SIGNIFICANT CHANGE UP (ref 5–17)
AST SERPL-CCNC: 16 U/L — SIGNIFICANT CHANGE UP (ref 10–40)
BILIRUB DIRECT SERPL-MCNC: 0.1 MG/DL — SIGNIFICANT CHANGE UP (ref 0–0.3)
BILIRUB INDIRECT FLD-MCNC: 0.2 MG/DL — SIGNIFICANT CHANGE UP (ref 0.2–1)
BILIRUB SERPL-MCNC: 0.3 MG/DL — SIGNIFICANT CHANGE UP (ref 0.2–1.2)
BUN SERPL-MCNC: 23 MG/DL — SIGNIFICANT CHANGE UP (ref 7–23)
CALCIUM SERPL-MCNC: 8.3 MG/DL — LOW (ref 8.4–10.5)
CHLORIDE SERPL-SCNC: 105 MMOL/L — SIGNIFICANT CHANGE UP (ref 96–108)
CO2 SERPL-SCNC: 25 MMOL/L — SIGNIFICANT CHANGE UP (ref 22–31)
CREAT SERPL-MCNC: 1.8 MG/DL — HIGH (ref 0.5–1.3)
EGFR: 29 ML/MIN/1.73M2 — LOW
GLUCOSE SERPL-MCNC: 126 MG/DL — HIGH (ref 70–99)
HCT VFR BLD CALC: 24.7 % — LOW (ref 34.5–45)
HGB BLD-MCNC: 7.3 G/DL — LOW (ref 11.5–15.5)
MCHC RBC-ENTMCNC: 23.5 PG — LOW (ref 27–34)
MCHC RBC-ENTMCNC: 29.6 GM/DL — LOW (ref 32–36)
MCV RBC AUTO: 79.7 FL — LOW (ref 80–100)
NRBC # BLD: 0 /100 WBCS — SIGNIFICANT CHANGE UP (ref 0–0)
PLATELET # BLD AUTO: 622 K/UL — HIGH (ref 150–400)
POTASSIUM SERPL-MCNC: 3.5 MMOL/L — SIGNIFICANT CHANGE UP (ref 3.5–5.3)
POTASSIUM SERPL-SCNC: 3.5 MMOL/L — SIGNIFICANT CHANGE UP (ref 3.5–5.3)
PROT SERPL-MCNC: 5.5 G/DL — LOW (ref 6–8.3)
RBC # BLD: 3.1 M/UL — LOW (ref 3.8–5.2)
RBC # FLD: 16 % — HIGH (ref 10.3–14.5)
SODIUM SERPL-SCNC: 142 MMOL/L — SIGNIFICANT CHANGE UP (ref 135–145)
WBC # BLD: 13.97 K/UL — HIGH (ref 3.8–10.5)
WBC # FLD AUTO: 13.97 K/UL — HIGH (ref 3.8–10.5)

## 2023-04-02 PROCEDURE — 99222 1ST HOSP IP/OBS MODERATE 55: CPT | Mod: GC

## 2023-04-02 PROCEDURE — 99233 SBSQ HOSP IP/OBS HIGH 50: CPT

## 2023-04-02 RX ORDER — SODIUM CHLORIDE 9 MG/ML
1000 INJECTION, SOLUTION INTRAVENOUS
Refills: 0 | Status: DISCONTINUED | OUTPATIENT
Start: 2023-04-02 | End: 2023-04-04

## 2023-04-02 RX ORDER — IRON SUCROSE 20 MG/ML
200 INJECTION, SOLUTION INTRAVENOUS EVERY 24 HOURS
Refills: 0 | Status: COMPLETED | OUTPATIENT
Start: 2023-04-02 | End: 2023-04-06

## 2023-04-02 RX ADMIN — Medication 3 MILLILITER(S): at 15:09

## 2023-04-02 RX ADMIN — Medication 3 MILLILITER(S): at 05:05

## 2023-04-02 RX ADMIN — PIPERACILLIN AND TAZOBACTAM 25 GRAM(S): 4; .5 INJECTION, POWDER, LYOPHILIZED, FOR SOLUTION INTRAVENOUS at 15:09

## 2023-04-02 RX ADMIN — Medication 3 MILLILITER(S): at 23:16

## 2023-04-02 RX ADMIN — IRON SUCROSE 200 MILLIGRAM(S): 20 INJECTION, SOLUTION INTRAVENOUS at 23:16

## 2023-04-02 RX ADMIN — PIPERACILLIN AND TAZOBACTAM 25 GRAM(S): 4; .5 INJECTION, POWDER, LYOPHILIZED, FOR SOLUTION INTRAVENOUS at 05:04

## 2023-04-02 RX ADMIN — PIPERACILLIN AND TAZOBACTAM 25 GRAM(S): 4; .5 INJECTION, POWDER, LYOPHILIZED, FOR SOLUTION INTRAVENOUS at 21:15

## 2023-04-02 RX ADMIN — Medication 650 MILLIGRAM(S): at 23:16

## 2023-04-02 RX ADMIN — Medication 325 MILLIGRAM(S): at 13:24

## 2023-04-02 NOTE — CONSULT NOTE ADULT - ASSESSMENT
Pt. is a 72 y.o. F w/o significant PMHx presents for worsening lethhargy. Nephrology consulted for MINERVA. 72 y.o. F w/o significant PMHx presents for worsening lethargy. Nephrology consulted for MINERVA.

## 2023-04-02 NOTE — CONSULT NOTE ADULT - ASSESSMENT
72 year old female who reports having a life long history of anemia (previously on iron and B12 supplementation) admitted for sepsis in the setting of PNA. Now found to have thrombocytosis and worsening anemia, for which hematology is being consulted.     Anemia/Thrombocytosis  #Pt presented with Hgb of 9.0 now downtrended to 7.3.Pt with documented Hgb of 12 in February however unclear baseline as number is isolated and patient reports hx of chronic anemia. B12 and folate noted to be normal. Iron studies revealed low iron, low TIBC and elevated ferritin concerning for inflammatory process, however patient with microcytosis and over picture concerning for  a concurrent iron deficiency component. Iron deficit was 1102mg as per Ganzoni equation.  #Pt with thrombocytosis to the 600s, appears to be improving. Suspect this is reactive thrombocytosis in the setting of inflammation and iron deficiency.  -Recommend IV iron 200mg daily for 5 days  -Monitor CBC with differential daily and transfuse to maintain Hb >7, and platelet count >10, >20 if febrile, and >50 prior to procedures.

## 2023-04-02 NOTE — PROGRESS NOTE ADULT - PROBLEM SELECTOR PLAN 6
urine lytes prerenal but not improving with IVF  US showing echogenic kidneys no hydro  Renal consulted appreciated will check vit d levels  BMP in am FeNA 1% intrinsic    US showing echogenic kidneys no hydro  Renal consulted appreciated will check vit d levels  BMP in am  IVF in case prerenal lead to intrinsic

## 2023-04-02 NOTE — PROGRESS NOTE ADULT - SUBJECTIVE AND OBJECTIVE BOX
PROGRESS NOTE:   Abigail Lyles DO  Hospitalist  Pager [phone]  After 5pm/weekends or if no answer ext: [phone]      Patient is a 72y old  Female who presents with a chief complaint of Cough (02 Apr 2023 13:56)      SUBJECTIVE / OVERNIGHT EVENTS: GABRIELA    ADDITIONAL REVIEW OF SYSTEMS:  low grade fevers and chills  no n/v/d    MEDICATIONS  (STANDING):  albuterol/ipratropium for Nebulization 3 milliLiter(s) Nebulizer every 6 hours  ferrous    sulfate 325 milliGRAM(s) Oral daily  piperacillin/tazobactam IVPB.. 3.375 Gram(s) IV Intermittent every 8 hours    MEDICATIONS  (PRN):  acetaminophen     Tablet .. 650 milliGRAM(s) Oral every 6 hours PRN Temp greater or equal to 38C (100.4F), Mild Pain (1 - 3)      CAPILLARY BLOOD GLUCOSE        I&O's Summary    01 Apr 2023 07:01  -  02 Apr 2023 07:00  --------------------------------------------------------  IN: 0 mL / OUT: 175 mL / NET: -175 mL        PHYSICAL EXAM:  Vital Signs Last 24 Hrs  T(C): 36.8 (02 Apr 2023 09:30), Max: 38.1 (01 Apr 2023 23:30)  T(F): 98.2 (02 Apr 2023 09:30), Max: 100.6 (01 Apr 2023 23:30)  HR: 98 (02 Apr 2023 09:30) (90 - 119)  BP: 103/63 (02 Apr 2023 09:30) (95/61 - 103/63)  BP(mean): --  RR: 18 (02 Apr 2023 09:30) (18 - 18)  SpO2: 96% (02 Apr 2023 09:30) (96% - 98%)    Parameters below as of 02 Apr 2023 09:30  Patient On (Oxygen Delivery Method): room air        CONSTITUTIONAL: NAD, well-developed  RESPIRATORY: Normal respiratory effort; lungs are clear to auscultation bilaterally  CARDIOVASCULAR: Regular rate and rhythm, normal S1 and S2, no murmur/rub/gallop; No lower extremity edema; Peripheral pulses are 2+ bilaterally  ABDOMEN: Nontender to palpation, normoactive bowel sounds, no rebound/guarding; No hepatosplenomegaly  MUSCLOSKELETAL: no clubbing or cyanosis of digits; no joint swelling or tenderness to palpation  PSYCH: A+O to person, place, and time; affect appropriate    LABS:                        7.3    13.97 )-----------( 622      ( 02 Apr 2023 06:59 )             24.7     04-02    142  |  105  |  23  ----------------------------<  126<H>  3.5   |  25  |  1.80<H>    Ca    8.3<L>      02 Apr 2023 06:58  Phos  3.7     04-01  Mg     2.0     04-01    TPro  5.5<L>  /  Alb  2.2<L>  /  TBili  0.3  /  DBili  0.1  /  AST  16  /  ALT  7<L>  /  AlkPhos  81  04-02              Culture - Urine (collected 31 Mar 2023 05:31)  Source: Catheterized Catheterized  Final Report (01 Apr 2023 16:02):    <10,000 CFU/ml Normal Urogenital christine present    Culture - Blood (collected 31 Mar 2023 03:10)  Source: .Blood Blood-Peripheral  Preliminary Report (01 Apr 2023 09:02):    No growth to date.    Culture - Blood (collected 31 Mar 2023 02:53)  Source: .Blood Blood-Peripheral  Preliminary Report (01 Apr 2023 09:02):    No growth to date.        RADIOLOGY & ADDITIONAL TESTS:  Results Reviewed:   Imaging Personally Reviewed:  Electrocardiogram Personally Reviewed:    COORDINATION OF CARE:  Care Discussed with Consultants/Other Providers [Y/N]:  Prior or Outpatient Records Reviewed [Y/N]:

## 2023-04-02 NOTE — CONSULT NOTE ADULT - SUBJECTIVE AND OBJECTIVE BOX
[facility] DIVISION OF KIDNEY DISEASES AND HYPERTENSION -- [phone]  -- INITIAL CONSULT NOTE  --------------------------------------------------------------------------------  HPI:  Pt. is a 72 y.o. F w/o significant PMHx presents for worsening lethhargy. Nephrology consulted for MINERVA.  Per mauri review Pt. was treated with Augmentin and Zithromax course for CAP finishing several weeks ago. Since that time she has continued to feel weak, rarely getting out of bed or leaving the house and persistent chills predominately at night. Pt. endorses decreased PO in take and fluid intake over this time because she did not want to go to the bathroom as much and noticed decreased UOP. She endorses taking multiple vitamins and supplements including Vitamin D 10k units daily for 3 years, Vitamin E 250mg daily, Echnicaea, Vitamin C 1000mg daily, Calcium 50mg daily and Zinc 50mg daily. Renal US showing increased echogenicity without hydronephrosis. Pt. received IV contrast on 3/31. Pt. endorses that at baseline her BP tends to run low with SBP in the 90s.         PAST HISTORY  --------------------------------------------------------------------------------  PAST MEDICAL & SURGICAL HISTORY:  No pertinent past medical history        FAMILY HISTORY:    PAST SOCIAL HISTORY:    ALLERGIES & MEDICATIONS  --------------------------------------------------------------------------------  Allergies    No Known Allergies    Intolerances      Standing Inpatient Medications  albuterol/ipratropium for Nebulization 3 milliLiter(s) Nebulizer every 6 hours  ferrous    sulfate 325 milliGRAM(s) Oral daily  piperacillin/tazobactam IVPB.. 3.375 Gram(s) IV Intermittent every 8 hours    PRN Inpatient Medications  acetaminophen     Tablet .. 650 milliGRAM(s) Oral every 6 hours PRN      REVIEW OF SYSTEMS  --------------------------------------------------------------------------------  As per HPI    VITALS/PHYSICAL EXAM  --------------------------------------------------------------------------------  T(C): 36.8 (04-02-23 @ 09:30), Max: 38.1 (04-01-23 @ 23:30)  HR: 98 (04-02-23 @ 09:30) (90 - 119)  BP: 103/63 (04-02-23 @ 09:30) (95/61 - 103/63)  RR: 18 (04-02-23 @ 09:30) (18 - 18)  SpO2: 96% (04-02-23 @ 09:30) (96% - 98%)  Wt(kg): --      04-01-23 @ 07:01  -  04-02-23 @ 07:00  --------------------------------------------------------  IN: 0 mL / OUT: 175 mL / NET: -175 mL      Physical Exam:  	Gen: NAD  	HEENT: MMM  	Pulm: wheezing, decreased air flow.   	CV: S1S2  	Abd: Soft, +BS   	Ext: No LE edema B/L  	Neuro: Awake  	Skin: Warm and dry  	Vascular access: peripheral      LABS/STUDIES  --------------------------------------------------------------------------------              7.3    13.97 >-----------<  622      [04-02-23 @ 06:59]              24.7     142  |  105  |  23  ----------------------------<  126      [04-02-23 @ 06:58]  3.5   |  25  |  1.80        Ca     8.3     [04-02-23 @ 06:58]      Mg     2.0     [04-01-23 @ 07:13]      Phos  3.7     [04-01-23 @ 07:13]    TPro  5.5  /  Alb  2.2  /  TBili  0.3  /  DBili  0.1  /  AST  16  /  ALT  7   /  AlkPhos  81  [04-02-23 @ 06:58]              [04-01-23 @ 07:13]    Creatinine Trend:  SCr 1.80 [04-02 @ 06:58]  SCr 1.76 [04-01 @ 07:13]  SCr 1.58 [03-31 @ 03:24]    Urinalysis - [03-31-23 @ 05:31]      Color Yellow / Appearance Clear / SG 1.012 / pH 7.0      Gluc Negative / Ketone Negative  / Bili Negative / Urobili Negative       Blood Trace / Protein 30 mg/dL / Leuk Est Negative / Nitrite Negative      RBC 8 / WBC 12 / Hyaline 1 / Gran  / Sq Epi  / Non Sq Epi 3 / Bacteria Negative    Urine Creatinine 82      [04-01-23 @ 17:21]  Urine Sodium 66      [04-01-23 @ 17:21]    Iron 18, TIBC 172, %sat 10      [04-01-23 @ 07:13]  Ferritin 581      [04-01-23 @ 07:16]    HCV 0.10, Nonreact      [04-01-23 @ 07:16]

## 2023-04-02 NOTE — CONSULT NOTE ADULT - PROBLEM SELECTOR RECOMMENDATION 9
Pt with MINERVA in the setting of sepsis, hypotension and IV contrast exposure. Exact duration of MINERVA however unknown. Pt. admitted with SCr. of 1.5 which has trended to 1.8. Urine studies and clinical picture consistent with pre-renal etiology. Optimize hemodynamics. Keep MAP above 65. Renal US showing increased echogenicity. Monitor labs and urine output. Avoid NSAIDs, ACEI/ARBS, RCA and nephrotoxins. Dose medications as per eGFR.    Discussed at length cessation of all OTC supplements and Vitamins. Please check Vitamin D levels. Encourage Po intake. check TSAT. Transfusion as per primary team.     If you have any questions, please feel free to contact me  Flaco Zavaleta  Nephrology Fellow  [phone]; Prefer [employer] TEAMS  (After 5pm or on weekends please page the on-call fellow)

## 2023-04-02 NOTE — PROGRESS NOTE ADULT - PROBLEM SELECTOR PLAN 5
Microcytic  No hx of bleeding, but previous hgb 12.7 last month   iron studies, reticulocyte count suggest BREA with some elevated ferritin may be reactive (still denies bleeding)  Normal B12 and folic acid (doesn't eat meat but supplements)    LDH/Hapto both high unlikely hemolyzing and bili normal  Keep active t/s  transfuse < 7 discussed w/ pt   Heme consulted, discussed with team they will review peripheral smear Microcytic  No hx of bleeding, but previous hgb 12.7 last month   iron studies, reticulocyte count suggest BREA with some elevated ferritin may be reactive (still denies bleeding)  Normal B12 and folic acid (doesn't eat meat but supplements)    LDH/Hapto both high unlikely hemolyzing and bili normal  Keep active t/s  transfuse < 7 discussed w/ pt   Heme consulted, discussed with team they will review peripheral smear   ?MM w/u- renal and heme following will check spep/upep

## 2023-04-02 NOTE — CONSULT NOTE ADULT - SUBJECTIVE AND OBJECTIVE BOX
HPI as per admitting team:   72F no significant past medical history except for recent pneumonia treated with PO abx.  She says that she was treated with Augmentin and Zithromax and abx finished several weeks ago. Since that time she has continued to feel weak, rarely getting out of bed or leaving the house and persistent chills predominately at night.  Her cough as continued, again moreso at night.  She also denies any history of anemia or noted any bleeding, no hx of thrombocytosis or renal disease.  She lives with a domestic partner of 20+ years.  Takes vitamins but no medications daily.  No chest pain. (31 Mar 2023 15:27)        REVIEW OF SYSTEMS:    CONSTITUTIONAL: No weakness, fevers or chills  EYES/ENT: No visual changes;  No vertigo or throat pain   NECK: No pain or stiffness  RESPIRATORY: No cough, wheezing, hemoptysis; No shortness of breath  CARDIOVASCULAR: No chest pain or palpitations  GASTROINTESTINAL: No abdominal or epigastric pain. No nausea, vomiting, or hematemesis; No diarrhea or constipation. No melena or hematochezia.  GENITOURINARY: No dysuria, frequency or hematuria  NEUROLOGICAL: No numbness or weakness  SKIN: No itching, burning, rashes, or lesions   All other review of systems is negative unless indicated above.    PAST MEDICAL & SURGICAL HISTORY:  No pertinent past medical history          FAMILY HISTORY:      SOCIAL HISTORY:     Allergies    No Known Allergies    Intolerances        MEDICATIONS  (STANDING):  albuterol/ipratropium for Nebulization 3 milliLiter(s) Nebulizer every 6 hours  ferrous    sulfate 325 milliGRAM(s) Oral daily  piperacillin/tazobactam IVPB.. 3.375 Gram(s) IV Intermittent every 8 hours    MEDICATIONS  (PRN):  acetaminophen     Tablet .. 650 milliGRAM(s) Oral every 6 hours PRN Temp greater or equal to 38C (100.4F), Mild Pain (1 - 3)      OBJECTIVE       T(F): 98.2 (04-02-23 @ 09:30), Max: 100.6 (04-01-23 @ 23:30)  HR: 98 (04-02-23 @ 09:30)  BP: 103/63 (04-02-23 @ 09:30)  RR: 18 (04-02-23 @ 09:30)  SpO2: 96% (04-02-23 @ 09:30)  Wt(kg): --    PHYSICAL EXAM   GENERAL: NAD, well-developed  HEAD:  Atraumatic, Normocephalic  EYES: EOMI, PERRLA, conjunctiva and sclera clear  NECK: Supple, No JVD  CHEST/LUNG: Clear to auscultation bilaterally; No wheeze  HEART: Regular rate and rhythm; No murmurs, rubs, or gallops  ABDOMEN: Soft, Nontender, Nondistended; Bowel sounds present  EXTREMITIES:  2+ Peripheral Pulses, No clubbing, cyanosis, or edema  NEUROLOGY: non-focal  SKIN: No rashes or lesions                          7.3    13.97 )-----------( 622      ( 02 Apr 2023 06:59 )             24.7       04-02    142  |  105  |  23  ----------------------------<  126<H>  3.5   |  25  |  1.80<H>    Ca    8.3<L>      02 Apr 2023 06:58  Phos  3.7     04-01  Mg     2.0     04-01             HPI as per admitting team:   72F no significant past medical history except for recent pneumonia treated with PO abx.  She says that she was treated with Augmentin and Zithromax and abx finished several weeks ago. Since that time she has continued to feel weak, rarely getting out of bed or leaving the house and persistent chills predominately at night.  Her cough as continued, again moreso at night.  She also denies any history of anemia or noted any bleeding, no hx of thrombocytosis or renal disease.  She lives with a domestic partner of 20+ years.  Takes vitamins but no medications daily.  No chest pain. (31 Mar 2023 15:27)        REVIEW OF SYSTEMS:  CONSTITUTIONAL: No weakness, fevers or chills  EYES/ENT: No visual changes;  No vertigo or throat pain   NECK: No pain or stiffness  RESPIRATORY:+cough; No shortness of breath  CARDIOVASCULAR: No chest pain or palpitations  GASTROINTESTINAL: No abdominal or epigastric pain. No nausea, vomiting, or hematemesis; No diarrhea or constipation. No melena or hematochezia.  GENITOURINARY: No dysuria, frequency or hematuria  NEUROLOGICAL: No numbness or weakness  SKIN: No itching, burning, rashes, or lesions       PAST MEDICAL & SURGICAL HISTORY:  No pertinent past medical history        Allergies  No Known Allergies  Intolerances        MEDICATIONS  (STANDING):  albuterol/ipratropium for Nebulization 3 milliLiter(s) Nebulizer every 6 hours  ferrous    sulfate 325 milliGRAM(s) Oral daily  piperacillin/tazobactam IVPB.. 3.375 Gram(s) IV Intermittent every 8 hours    MEDICATIONS  (PRN):  acetaminophen     Tablet .. 650 milliGRAM(s) Oral every 6 hours PRN Temp greater or equal to 38C (100.4F), Mild Pain (1 - 3)      OBJECTIVE   T(F): 98.2 (04-02-23 @ 09:30), Max: 100.6 (04-01-23 @ 23:30)  HR: 98 (04-02-23 @ 09:30)  BP: 103/63 (04-02-23 @ 09:30)  RR: 18 (04-02-23 @ 09:30)  SpO2: 96% (04-02-23 @ 09:30)  Wt(kg): --    PHYSICAL EXAM   GENERAL: NAD, well-developed  HEAD:  Atraumatic, Normocephalic  EYES: EOMI, PERRLA, conjunctiva and sclera clear  NECK: Supple, No JVD  CHEST/LUNG: No increased work of breathing or accessory muscle use  HEART: Regular rate and rhythm; No murmurs, rubs, or gallops  ABDOMEN: Soft, Nontender, Nondistended; Bowel sounds present  EXTREMITIES:  2+ Peripheral Pulses, No clubbing, cyanosis, or edema      LABS                          7.3    13.97 )-----------( 622      ( 02 Apr 2023 06:59 )             24.7       04-02    142  |  105  |  23  ----------------------------<  126<H>  3.5   |  25  |  1.80<H>    Ca    8.3<L>      02 Apr 2023 06:58  Phos  3.7     04-01  Mg     2.0     04-01             Cheek Interpolation Flap Text: A decision was made to reconstruct the defect utilizing an interpolation axial flap and a staged reconstruction.  A telfa template was made of the defect.  This telfa template was then used to outline the Cheek Interpolation flap.  The donor area for the pedicle flap was then injected with anesthesia.  The flap was excised through the skin and subcutaneous tissue down to the layer of the underlying musculature.  The interpolation flap was carefully excised within this deep plane to maintain its blood supply.  The edges of the donor site were undermined.   The donor site was closed in a primary fashion.  The pedicle was then rotated into position and sutured.  Once the tube was sutured into place, adequate blood supply was confirmed with blanching and refill.  The pedicle was then wrapped with xeroform gauze and dressed appropriately with a telfa and gauze bandage to ensure continued blood supply and protect the attached pedicle.

## 2023-04-03 LAB
24R-OH-CALCIDIOL SERPL-MCNC: 60.9 NG/ML — SIGNIFICANT CHANGE UP (ref 30–80)
ANION GAP SERPL CALC-SCNC: 11 MMOL/L — SIGNIFICANT CHANGE UP (ref 5–17)
APPEARANCE UR: CLEAR — SIGNIFICANT CHANGE UP
BACTERIA # UR AUTO: NEGATIVE — SIGNIFICANT CHANGE UP
BASOPHILS # BLD AUTO: 0.07 K/UL — SIGNIFICANT CHANGE UP (ref 0–0.2)
BASOPHILS NFR BLD AUTO: 0.5 % — SIGNIFICANT CHANGE UP (ref 0–2)
BILIRUB UR-MCNC: NEGATIVE — SIGNIFICANT CHANGE UP
BUN SERPL-MCNC: 21 MG/DL — SIGNIFICANT CHANGE UP (ref 7–23)
CALCIUM SERPL-MCNC: 8.2 MG/DL — LOW (ref 8.4–10.5)
CHLORIDE SERPL-SCNC: 104 MMOL/L — SIGNIFICANT CHANGE UP (ref 96–108)
CO2 SERPL-SCNC: 25 MMOL/L — SIGNIFICANT CHANGE UP (ref 22–31)
COLOR SPEC: SIGNIFICANT CHANGE UP
CREAT SERPL-MCNC: 2.12 MG/DL — HIGH (ref 0.5–1.3)
DIFF PNL FLD: ABNORMAL
EGFR: 24 ML/MIN/1.73M2 — LOW
EOSINOPHIL # BLD AUTO: 0.21 K/UL — SIGNIFICANT CHANGE UP (ref 0–0.5)
EOSINOPHIL NFR BLD AUTO: 1.5 % — SIGNIFICANT CHANGE UP (ref 0–6)
EPI CELLS # UR: 5 /HPF — SIGNIFICANT CHANGE UP
GLUCOSE SERPL-MCNC: 101 MG/DL — HIGH (ref 70–99)
GLUCOSE UR QL: ABNORMAL
HCT VFR BLD CALC: 23.5 % — LOW (ref 34.5–45)
HGB BLD-MCNC: 7.3 G/DL — LOW (ref 11.5–15.5)
HYALINE CASTS # UR AUTO: 2 /LPF — SIGNIFICANT CHANGE UP (ref 0–2)
IMM GRANULOCYTES NFR BLD AUTO: 1.3 % — HIGH (ref 0–0.9)
KETONES UR-MCNC: NEGATIVE — SIGNIFICANT CHANGE UP
LEUKOCYTE ESTERASE UR-ACNC: ABNORMAL
LYMPHOCYTES # BLD AUTO: 1.31 K/UL — SIGNIFICANT CHANGE UP (ref 1–3.3)
LYMPHOCYTES # BLD AUTO: 9.5 % — LOW (ref 13–44)
MCHC RBC-ENTMCNC: 24.6 PG — LOW (ref 27–34)
MCHC RBC-ENTMCNC: 31.1 GM/DL — LOW (ref 32–36)
MCV RBC AUTO: 79.1 FL — LOW (ref 80–100)
MONOCYTES # BLD AUTO: 1.07 K/UL — HIGH (ref 0–0.9)
MONOCYTES NFR BLD AUTO: 7.8 % — SIGNIFICANT CHANGE UP (ref 2–14)
NEUTROPHILS # BLD AUTO: 10.95 K/UL — HIGH (ref 1.8–7.4)
NEUTROPHILS NFR BLD AUTO: 79.4 % — HIGH (ref 43–77)
NITRITE UR-MCNC: NEGATIVE — SIGNIFICANT CHANGE UP
NRBC # BLD: 0 /100 WBCS — SIGNIFICANT CHANGE UP (ref 0–0)
PH UR: 6.5 — SIGNIFICANT CHANGE UP (ref 5–8)
PLATELET # BLD AUTO: 594 K/UL — HIGH (ref 150–400)
POTASSIUM SERPL-MCNC: 3.7 MMOL/L — SIGNIFICANT CHANGE UP (ref 3.5–5.3)
POTASSIUM SERPL-SCNC: 3.7 MMOL/L — SIGNIFICANT CHANGE UP (ref 3.5–5.3)
PROT SERPL-MCNC: 5.1 G/DL — LOW (ref 6–8.3)
PROT SERPL-MCNC: 5.1 G/DL — LOW (ref 6–8.3)
PROT UR-MCNC: ABNORMAL
RBC # BLD: 2.97 M/UL — LOW (ref 3.8–5.2)
RBC # FLD: 15.9 % — HIGH (ref 10.3–14.5)
RBC CASTS # UR COMP ASSIST: 13 /HPF — HIGH (ref 0–4)
SODIUM SERPL-SCNC: 140 MMOL/L — SIGNIFICANT CHANGE UP (ref 135–145)
SP GR SPEC: 1.02 — SIGNIFICANT CHANGE UP (ref 1.01–1.02)
TRANSFERRIN SERPL-MCNC: 113 MG/DL — LOW (ref 200–360)
UROBILINOGEN FLD QL: NEGATIVE — SIGNIFICANT CHANGE UP
VIT D25+D1,25 OH+D1,25 PNL SERPL-MCNC: 24.4 PG/ML — SIGNIFICANT CHANGE UP (ref 19.9–79.3)
WBC # BLD: 13.79 K/UL — HIGH (ref 3.8–10.5)
WBC # FLD AUTO: 13.79 K/UL — HIGH (ref 3.8–10.5)
WBC UR QL: 24 /HPF — HIGH (ref 0–5)

## 2023-04-03 PROCEDURE — 99223 1ST HOSP IP/OBS HIGH 75: CPT | Mod: GC

## 2023-04-03 PROCEDURE — 99232 SBSQ HOSP IP/OBS MODERATE 35: CPT

## 2023-04-03 RX ORDER — CHLORHEXIDINE GLUCONATE 213 G/1000ML
1 SOLUTION TOPICAL DAILY
Refills: 0 | Status: DISCONTINUED | OUTPATIENT
Start: 2023-04-03 | End: 2023-04-13

## 2023-04-03 RX ADMIN — PIPERACILLIN AND TAZOBACTAM 25 GRAM(S): 4; .5 INJECTION, POWDER, LYOPHILIZED, FOR SOLUTION INTRAVENOUS at 05:35

## 2023-04-03 RX ADMIN — Medication 3 MILLILITER(S): at 05:35

## 2023-04-03 RX ADMIN — Medication 325 MILLIGRAM(S): at 13:49

## 2023-04-03 RX ADMIN — SODIUM CHLORIDE 75 MILLILITER(S): 9 INJECTION, SOLUTION INTRAVENOUS at 18:26

## 2023-04-03 RX ADMIN — IRON SUCROSE 200 MILLIGRAM(S): 20 INJECTION, SOLUTION INTRAVENOUS at 22:15

## 2023-04-03 RX ADMIN — Medication 3 MILLILITER(S): at 13:48

## 2023-04-03 RX ADMIN — PIPERACILLIN AND TAZOBACTAM 25 GRAM(S): 4; .5 INJECTION, POWDER, LYOPHILIZED, FOR SOLUTION INTRAVENOUS at 13:49

## 2023-04-03 RX ADMIN — CHLORHEXIDINE GLUCONATE 1 APPLICATION(S): 213 SOLUTION TOPICAL at 13:49

## 2023-04-03 RX ADMIN — PIPERACILLIN AND TAZOBACTAM 25 GRAM(S): 4; .5 INJECTION, POWDER, LYOPHILIZED, FOR SOLUTION INTRAVENOUS at 21:45

## 2023-04-03 RX ADMIN — Medication 3 MILLILITER(S): at 18:26

## 2023-04-03 NOTE — PROGRESS NOTE ADULT - PROBLEM SELECTOR PLAN 6
FeNA 1% intrinsic, due to ATN  US showing echogenic kidneys no hydro  Renal consulted appreciated will check vit d levels  BMP QD  IVF in case prerenal  Protein/Cr ratio

## 2023-04-03 NOTE — PROGRESS NOTE ADULT - PROBLEM SELECTOR PLAN 5
Microcytic  No hx of bleeding, but previous hgb 12.7 last month - check occult blood   iron studies, reticulocyte count suggest BREA with some elevated ferritin may be reactive (still denies bleeding)  Normal B12 and folic acid (doesn't eat meat but supplements)    LDH/Hapto both high unlikely hemolyzing and bili normal  Keep active t/s, transfuse < 7  IV iron x5 days  Heme f/u  ?MM w/u- renal and heme following will check spep/upep Microcytic  No hx of bleeding, but previous hgb 12.7 last month - check occult blood   iron studies, reticulocyte count suggest BREA with some elevated ferritin may be reactive (still denies bleeding)  Normal B12 and folic acid (doesn't eat meat but supplements)    LDH/Hapto both high unlikely hemolyzing and bili normal  Keep active t/s, transfuse < 7  IV iron x5 days  Heme f/u  ?MM w/u- renal and heme following will check spep/upep  -d/w patient - has had colonoscopy in the past but not within the last 5 years - not sure if she wants a colonoscopy done as inpatient. Had a BM earlier today that was brown, no blood.

## 2023-04-03 NOTE — PROGRESS NOTE ADULT - SUBJECTIVE AND OBJECTIVE BOX
[facility] Division of Hospital Medicine  Lilli Burns DO  Available on Teams Pattie    Patient is a 72y old  Female who presents with a chief complaint of Cough (2023 14:58)      SUBJECTIVE / OVERNIGHT EVENTS: Febrile to 101.8F overnight but overall, feels better, has less of a dry cough. Is able to walk to the bathroom easier than before with less SOB. Denies abdominal pain, constipation, diarrhea, issues with urination.  ADDITIONAL REVIEW OF SYSTEMS: negative    MEDICATIONS  (STANDING):  albuterol/ipratropium for Nebulization 3 milliLiter(s) Nebulizer every 6 hours  chlorhexidine 2% Cloths 1 Application(s) Topical daily  ferrous    sulfate 325 milliGRAM(s) Oral daily  iron sucrose Injectable 200 milliGRAM(s) IV Push every 24 hours  lactated ringers. 1000 milliLiter(s) (75 mL/Hr) IV Continuous <Continuous>  piperacillin/tazobactam IVPB.. 3.375 Gram(s) IV Intermittent every 8 hours    MEDICATIONS  (PRN):  acetaminophen     Tablet .. 650 milliGRAM(s) Oral every 6 hours PRN Temp greater or equal to 38C (100.4F), Mild Pain (1 - 3)      CAPILLARY BLOOD GLUCOSE        I&O's Summary      PHYSICAL EXAM:  Vital Signs Last 24 Hrs  T(C): 36.2 (2023 09:50), Max: 38.8 (2023 23:18)  T(F): 97.2 (2023 09:50), Max: 101.8 (2023 23:18)  HR: 95 (2023 09:50) (95 - 114)  BP: 115/77 (2023 09:50) (93/61 - 115/77)  BP(mean): --  RR: 18 (2023 09:50) (16 - 18)  SpO2: 95% (2023 09:50) (93% - 99%)    Parameters below as of 2023 09:50  Patient On (Oxygen Delivery Method): room air        CONSTITUTIONAL: Well-groomed, in no apparent distress  EYES: No conjunctival or scleral injection, non-icteric; PERRLA and symmetric  ENMT: No external nasal lesions; no pharyngeal injection or exudates, oral mucosa with moist membranes  NECK: Trachea midline without palpable neck mass; thyroid not enlarged and non-tender  RESPIRATORY: Breathing comfortably; lungs CTA without wheeze/rhonchi/rales  CARDIOVASCULAR: +S1S2, RRR, +systolic murmur loudest at RUSB; pedal pulses full and symmetric; no lower extremity edema  GASTROINTESTINAL: No palpable masses or tenderness, +BS throughout, no rebound/guarding; no hepatosplenomegaly; no hernia palpated  MUSCULOSKELETAL: no digital clubbing or cyanosis; no paraspinal tenderness; normal strength and tone of extremities  SKIN: No rashes or ulcers noted; no subcutaneous nodules or induration palpable  NEUROLOGIC: CN II-XII intact; sensation intact in LEs b/l to light touch  PSYCHIATRIC: A+O x 3; mood and affect appropriate; appropriate insight and judgment    LABS:                        7.3    13.79 )-----------( 594      ( 2023 07:12 )             23.5     04-03    140  |  104  |  21  ----------------------------<  101<H>  3.7   |  25  |  2.12<H>    Ca    8.2<L>      2023 07:08    TPro  5.1<L>  /  Alb  x   /  TBili  x   /  DBili  x   /  AST  x   /  ALT  x   /  AlkPhos  x   04-03          Urinalysis Basic - ( 2023 07:12 )    Color: Light Yellow / Appearance: Clear / S.020 / pH: x  Gluc: x / Ketone: Negative  / Bili: Negative / Urobili: Negative   Blood: x / Protein: 100 mg/dl / Nitrite: Negative   Leuk Esterase: Small / RBC: 13 /hpf / WBC 24 /HPF   Sq Epi: x / Non Sq Epi: 5 /hpf / Bacteria: Negative

## 2023-04-03 NOTE — PROGRESS NOTE ADULT - PROBLEM SELECTOR PLAN 1
2/2 PNA, repeat lactate cleared, blood and urine cultures ngtd  Continue abx for resistant organism given abx use within last 90 days  plan to treat for 5 days, last day 4/4  Sputum culture

## 2023-04-04 LAB
ANION GAP SERPL CALC-SCNC: 11 MMOL/L — SIGNIFICANT CHANGE UP (ref 5–17)
BLD GP AB SCN SERPL QL: NEGATIVE — SIGNIFICANT CHANGE UP
BUN SERPL-MCNC: 14 MG/DL — SIGNIFICANT CHANGE UP (ref 7–23)
CALCIUM SERPL-MCNC: 8.1 MG/DL — LOW (ref 8.4–10.5)
CHLORIDE SERPL-SCNC: 105 MMOL/L — SIGNIFICANT CHANGE UP (ref 96–108)
CO2 SERPL-SCNC: 23 MMOL/L — SIGNIFICANT CHANGE UP (ref 22–31)
CREAT SERPL-MCNC: 1.7 MG/DL — HIGH (ref 0.5–1.3)
EGFR: 31 ML/MIN/1.73M2 — LOW
GLUCOSE SERPL-MCNC: 96 MG/DL — SIGNIFICANT CHANGE UP (ref 70–99)
HCT VFR BLD CALC: 22.8 % — LOW (ref 34.5–45)
HCT VFR BLD CALC: 24 % — LOW (ref 34.5–45)
HGB BLD-MCNC: 7 G/DL — CRITICAL LOW (ref 11.5–15.5)
HGB BLD-MCNC: 7.2 G/DL — LOW (ref 11.5–15.5)
MAGNESIUM SERPL-MCNC: 1.7 MG/DL — SIGNIFICANT CHANGE UP (ref 1.6–2.6)
MCHC RBC-ENTMCNC: 24.1 PG — LOW (ref 27–34)
MCHC RBC-ENTMCNC: 24.4 PG — LOW (ref 27–34)
MCHC RBC-ENTMCNC: 30 GM/DL — LOW (ref 32–36)
MCHC RBC-ENTMCNC: 30.7 GM/DL — LOW (ref 32–36)
MCV RBC AUTO: 79.4 FL — LOW (ref 80–100)
MCV RBC AUTO: 80.3 FL — SIGNIFICANT CHANGE UP (ref 80–100)
NRBC # BLD: 0 /100 WBCS — SIGNIFICANT CHANGE UP (ref 0–0)
NRBC # BLD: 0 /100 WBCS — SIGNIFICANT CHANGE UP (ref 0–0)
PHOSPHATE SERPL-MCNC: 3.3 MG/DL — SIGNIFICANT CHANGE UP (ref 2.5–4.5)
PLATELET # BLD AUTO: 614 K/UL — HIGH (ref 150–400)
PLATELET # BLD AUTO: 646 K/UL — HIGH (ref 150–400)
POTASSIUM SERPL-MCNC: 3.9 MMOL/L — SIGNIFICANT CHANGE UP (ref 3.5–5.3)
POTASSIUM SERPL-SCNC: 3.9 MMOL/L — SIGNIFICANT CHANGE UP (ref 3.5–5.3)
RBC # BLD: 2.87 M/UL — LOW (ref 3.8–5.2)
RBC # BLD: 2.99 M/UL — LOW (ref 3.8–5.2)
RBC # FLD: 16.4 % — HIGH (ref 10.3–14.5)
RBC # FLD: 16.6 % — HIGH (ref 10.3–14.5)
RH IG SCN BLD-IMP: POSITIVE — SIGNIFICANT CHANGE UP
SARS-COV-2 RNA SPEC QL NAA+PROBE: SIGNIFICANT CHANGE UP
SODIUM SERPL-SCNC: 139 MMOL/L — SIGNIFICANT CHANGE UP (ref 135–145)
WBC # BLD: 14.54 K/UL — HIGH (ref 3.8–10.5)
WBC # BLD: 14.89 K/UL — HIGH (ref 3.8–10.5)
WBC # FLD AUTO: 14.54 K/UL — HIGH (ref 3.8–10.5)
WBC # FLD AUTO: 14.89 K/UL — HIGH (ref 3.8–10.5)

## 2023-04-04 PROCEDURE — 93306 TTE W/DOPPLER COMPLETE: CPT | Mod: 26

## 2023-04-04 PROCEDURE — 99233 SBSQ HOSP IP/OBS HIGH 50: CPT | Mod: GC

## 2023-04-04 PROCEDURE — 99223 1ST HOSP IP/OBS HIGH 75: CPT | Mod: GC

## 2023-04-04 PROCEDURE — 99232 SBSQ HOSP IP/OBS MODERATE 35: CPT

## 2023-04-04 RX ADMIN — Medication 3 MILLILITER(S): at 11:29

## 2023-04-04 RX ADMIN — CHLORHEXIDINE GLUCONATE 1 APPLICATION(S): 213 SOLUTION TOPICAL at 11:31

## 2023-04-04 RX ADMIN — Medication 325 MILLIGRAM(S): at 11:29

## 2023-04-04 RX ADMIN — Medication 3 MILLILITER(S): at 06:00

## 2023-04-04 RX ADMIN — PIPERACILLIN AND TAZOBACTAM 25 GRAM(S): 4; .5 INJECTION, POWDER, LYOPHILIZED, FOR SOLUTION INTRAVENOUS at 13:17

## 2023-04-04 RX ADMIN — Medication 3 MILLILITER(S): at 17:30

## 2023-04-04 RX ADMIN — PIPERACILLIN AND TAZOBACTAM 25 GRAM(S): 4; .5 INJECTION, POWDER, LYOPHILIZED, FOR SOLUTION INTRAVENOUS at 06:00

## 2023-04-04 RX ADMIN — PIPERACILLIN AND TAZOBACTAM 25 GRAM(S): 4; .5 INJECTION, POWDER, LYOPHILIZED, FOR SOLUTION INTRAVENOUS at 22:16

## 2023-04-04 RX ADMIN — IRON SUCROSE 200 MILLIGRAM(S): 20 INJECTION, SOLUTION INTRAVENOUS at 23:35

## 2023-04-04 NOTE — CONSULT NOTE ADULT - ATTENDING COMMENTS
Agree with above. Pt with pneumonia and still having fevers. Has a drop in H/H since admission to 7's, no overt bleeding. Iron studies mixed, low iron saturation but high ferritin could be acute phase reactant in setting of infection. She has a strong history of colon cancer, both parents reportedly had colon cancer. No recent endoscopic evaluation for at least 8 years. Once fevers/pneumonia is optimized, will offer eventual EGD/colonoscopy fo anemia workup.
Replete iron-deficiency anemia.    Thrombocytosis secondary to iron-deficiency and infection as it is an acute phase reactant.   Leukocytosis secondary to infection.     Shaq Rojas MD (Weekend Coverage)
71 yo lady with no past medical history admitted with pneumonia, found to have acute kidney injury     MINERVA- ATN likely from infection/ hypotension- made worse by contrast use. Monitor for now ( baseline creat 0.7 in Feb 2023> now 1.5-1.8)  check urine protein/creatinine ratio. was also taking a lot of supplements     check 25 hydroxy Vitamin D/ 1,25 dihydroxy Vitamin D levels    randi olmedo  nephrology attending   please contact me on TEAMS   Office- [phone]

## 2023-04-04 NOTE — PROGRESS NOTE ADULT - PROBLEM SELECTOR PLAN 1
Pt with MINERVA in the setting of sepsis, hypotension and IV contrast exposure. Exact duration of MINERVA however unknown. Likely ATN. Pt. admitted with SCr. of 1.5 which has trended to 2.12 most recently, pending AM labs. UA with some hematuria and trace proteinuria, please obtain spot urine TP/CR ratio. Pt currently non-oliguric. Pt able to tolerate proper PO intake, okay to discontinue IVF. Optimize hemodynamics. Renal US showing increased echogenicity without evidence of hydronephrosis. Monitor labs and urine output. Avoid nephrotoxins. Dose medications as per eGFR.

## 2023-04-04 NOTE — PROGRESS NOTE ADULT - SUBJECTIVE AND OBJECTIVE BOX
[facility] Division of Hospital Medicine  Lilli Burns DO  Available on Teams Pattie    Patient is a 72y old  Female who presents with a chief complaint of Cough (2023 06:17)      SUBJECTIVE / OVERNIGHT EVENTS: Had fever to 100.9F overnight, patient was unaware. Patient feels fine at rest, but when she gets out of bed starts to get SOB again.   ADDITIONAL REVIEW OF SYSTEMS: negative    MEDICATIONS  (STANDING):  albuterol/ipratropium for Nebulization 3 milliLiter(s) Nebulizer every 6 hours  chlorhexidine 2% Cloths 1 Application(s) Topical daily  ferrous    sulfate 325 milliGRAM(s) Oral daily  iron sucrose Injectable 200 milliGRAM(s) IV Push every 24 hours  piperacillin/tazobactam IVPB.. 3.375 Gram(s) IV Intermittent every 8 hours    MEDICATIONS  (PRN):  acetaminophen     Tablet .. 650 milliGRAM(s) Oral every 6 hours PRN Temp greater or equal to 38C (100.4F), Mild Pain (1 - 3)      CAPILLARY BLOOD GLUCOSE        I&O's Summary    2023 07:01  -  2023 07:00  --------------------------------------------------------  IN: 290 mL / OUT: 800 mL / NET: -510 mL        PHYSICAL EXAM:  Vital Signs Last 24 Hrs  T(C): 37.3 (2023 08:51), Max: 38.3 (2023 00:40)  T(F): 99.1 (2023 08:51), Max: 100.9 (2023 00:40)  HR: 93 (2023 08:51) (93 - 114)  BP: 98/66 (2023 08:51) (98/66 - 113/76)  BP(mean): --  RR: 18 (2023 08:51) (18 - 18)  SpO2: 96% (2023 08:51) (95% - 100%)    Parameters below as of 2023 04:25  Patient On (Oxygen Delivery Method): room air      CONSTITUTIONAL: Well-groomed, in no apparent distress  EYES: No conjunctival or scleral injection, non-icteric; PERRLA and symmetric  ENMT: No external nasal lesions; no pharyngeal injection or exudates, oral mucosa with moist membranes  NECK: Trachea midline without palpable neck mass; thyroid not enlarged and non-tender  RESPIRATORY: Breathing comfortably; lungs CTA without wheeze/rhonchi/rales  CARDIOVASCULAR: +S1S2, RRR, +systolic murmur loudest at RUSB; pedal pulses full and symmetric; no lower extremity edema  GASTROINTESTINAL: No palpable masses or tenderness, +BS throughout, no rebound/guarding; no hepatosplenomegaly; no hernia palpated  MUSCULOSKELETAL: no digital clubbing or cyanosis; no paraspinal tenderness; normal strength and tone of extremities  SKIN: No rashes or ulcers noted; no subcutaneous nodules or induration palpable  NEUROLOGIC: CN II-XII intact; sensation intact in LEs b/l to light touch  PSYCHIATRIC: A+O x 3; mood and affect appropriate; appropriate insight and judgment    LABS:                        7.2    14.89 )-----------( 646      ( 2023 10:24 )             24.0     04-04    139  |  105  |  14  ----------------------------<  96  3.9   |  23  |  1.70<H>    Ca    8.1<L>      2023 07:05  Phos  3.3     04-04  Mg     1.7     04-04    TPro  5.1<L>  /  Alb  x   /  TBili  x   /  DBili  x   /  AST  x   /  ALT  x   /  AlkPhos  x   04-          Urinalysis Basic - ( 2023 07:12 )    Color: Light Yellow / Appearance: Clear / S.020 / pH: x  Gluc: x / Ketone: Negative  / Bili: Negative / Urobili: Negative   Blood: x / Protein: 100 mg/dl / Nitrite: Negative   Leuk Esterase: Small / RBC: 13 /hpf / WBC 24 /HPF   Sq Epi: x / Non Sq Epi: 5 /hpf / Bacteria: Negative        Culture - Blood (collected 2023 07:33)  Source: .Blood Blood-Venous  Preliminary Report (2023 12:02):    No growth to date.    Culture - Blood (collected 2023 01:30)  Source: .Blood Blood-Peripheral  Preliminary Report (2023 05:01):    No growth to date.

## 2023-04-04 NOTE — PROGRESS NOTE ADULT - SUBJECTIVE AND OBJECTIVE BOX
[facility] DIVISION OF KIDNEY DISEASES AND HYPERTENSION -- FOLLOW UP NOTE  --------------------------------------------------------------------------------  Chief Complaint: MINERVA    24 hour events/subjective:  Pt. seen and examined this morning, Reports feeling well without any acute issues. Denied any chest pain, shortness of breath, nausea, or vomiting. No acut eissues noted overnight      PAST HISTORY  --------------------------------------------------------------------------------  No significant changes to PMH, PSH, FHx, SHx, unless otherwise noted    ALLERGIES & MEDICATIONS  --------------------------------------------------------------------------------  Allergies    No Known Allergies    Intolerances      Standing Inpatient Medications  albuterol/ipratropium for Nebulization 3 milliLiter(s) Nebulizer every 6 hours  chlorhexidine 2% Cloths 1 Application(s) Topical daily  ferrous    sulfate 325 milliGRAM(s) Oral daily  iron sucrose Injectable 200 milliGRAM(s) IV Push every 24 hours  lactated ringers. 1000 milliLiter(s) IV Continuous <Continuous>  piperacillin/tazobactam IVPB.. 3.375 Gram(s) IV Intermittent every 8 hours    PRN Inpatient Medications  acetaminophen     Tablet .. 650 milliGRAM(s) Oral every 6 hours PRN      REVIEW OF SYSTEMS    All other systems were reviewed and are negative, except as noted.    VITALS/PHYSICAL EXAM  --------------------------------------------------------------------------------  T(C): 37.3 (04-04-23 @ 04:25), Max: 38.3 (04-04-23 @ 00:40)  HR: 104 (04-04-23 @ 04:25) (95 - 114)  BP: 110/75 (04-04-23 @ 04:25) (107/71 - 115/77)  RR: 18 (04-04-23 @ 04:25) (18 - 18)  SpO2: 96% (04-04-23 @ 04:25) (95% - 100%)  Wt(kg): --        04-03-23 @ 07:01  -  04-04-23 @ 06:18  --------------------------------------------------------  IN: 290 mL / OUT: 700 mL / NET: -410 mL        Physical Exam:  	Gen: NAD  	HEENT: MMM  	Pulm: CTA B/L  	CV: S1S2  	Abd: Soft, +BS   	Ext: No LE edema B/L  	Neuro: Awake  	Skin: Warm and dry      LABS/STUDIES  --------------------------------------------------------------------------------              7.3    13.79 >-----------<  594      [04-03-23 @ 07:12]              23.5     140  |  104  |  21  ----------------------------<  101      [04-03-23 @ 07:08]  3.7   |  25  |  2.12        Ca     8.2     [04-03-23 @ 07:08]    TPro  5.1  /  Alb  x   /  TBili  x   /  DBili  x   /  AST  x   /  ALT  x   /  AlkPhos  x   [04-03-23 @ 07:08]          Creatinine Trend:  SCr 2.12 [04-03 @ 07:08]  SCr 1.80 [04-02 @ 06:58]  SCr 1.76 [04-01 @ 07:13]  SCr 1.58 [03-31 @ 03:24]    Urinalysis - [04-03-23 @ 07:12]      Color Light Yellow / Appearance Clear / SG 1.020 / pH 6.5      Gluc Trace / Ketone Negative  / Bili Negative / Urobili Negative       Blood Trace / Protein 100 mg/dl / Leuk Est Small / Nitrite Negative      RBC 13 / WBC 24 / Hyaline 2 / Gran  / Sq Epi  / Non Sq Epi 5 / Bacteria Negative    Urine Creatinine 82      [04-01-23 @ 17:21]  Urine Sodium 66      [04-01-23 @ 17:21]    Iron 18, TIBC 172, %sat 10      [04-01-23 @ 07:13]  Ferritin 581      [04-01-23 @ 07:16]  Vitamin D (25OH) 60.9      [04-03-23 @ 07:08]    HCV 0.10, Nonreact      [04-01-23 @ 07:16]

## 2023-04-04 NOTE — PROGRESS NOTE ADULT - PROBLEM SELECTOR PLAN 6
FeNA 1% intrinsic, due to ATN, now improving  US showing echogenic kidneys no hydro  Renal consult appreciated   BMP QD  IVF in case prerenal  Urine protein/Cr ratio

## 2023-04-04 NOTE — CONSULT NOTE ADULT - SUBJECTIVE AND OBJECTIVE BOX
HPI:    Ms. Steen is a 72 yrs old female w/ hx of BREA who presented w/ weakness and difficulty breathing. She was recently dx with pneumonia as o/p, was tx with abx but had persistent weakness, so presented to the ED. Hospital course complicated with pneumonia for which she is currently on Abx. During the hospitalization, her hgb started to decline from 9-->7 w/ no overt signs of GI bleeding. Denied melena, hematochezia, abd pain, nausea, vomiting. She had fevers and chills last night. Has family hx of colon cancer w/ both her parents, last colonoscopy 8 yrs ago which was normal, prior to that she was getting colonoscopies every 5 years. Denied AC and NSAID use. Takes ASA for headache every other week. Never had an upper endoscopy. GI consulted for anemia.     Allergies:  No Known Allergies    Home Medications:  · 	zinc (as acetate) 50 mg oral capsule: Last Dose Taken:  , 1 tab(s) orally once a day  · 	Elderberry: Last Dose Taken:  , 1 tablet once daily  · 	Echinacea: Last Dose Taken:  , 1 tablet once daily  · 	Vitamin D3 25 mcg (1000 intl units) oral tablet: Last Dose Taken:  , 1 tab(s) orally once a day    Hospital Medications:  acetaminophen     Tablet .. 650 milliGRAM(s) Oral every 6 hours PRN  albuterol/ipratropium for Nebulization 3 milliLiter(s) Nebulizer every 6 hours  chlorhexidine 2% Cloths 1 Application(s) Topical daily  ferrous    sulfate 325 milliGRAM(s) Oral daily  iron sucrose Injectable 200 milliGRAM(s) IV Push every 24 hours  piperacillin/tazobactam IVPB.. 3.375 Gram(s) IV Intermittent every 8 hours      PMHX/PSHX:  No pertinent past medical history      Family history:  Colon cancer with father and mother.     Social History:   Tob: Denies  EtOH: Denies  Illicit Drugs: Denies    ROS:     General:  No wt loss, fevers, chills, night sweats, fatigue  Eyes:  Good vision, no reported pain  ENT:  No sore throat, pain, runny nose, dysphagia  CV:  No pain, palpitations, hypo/hypertension  Pulm:  No dyspnea, cough, tachypnea, wheezing  GI:  see HPI  :  No pain, bleeding, incontinence, nocturia  Muscle:  No pain, weakness  Neuro:  No weakness, tingling, memory problems  Psych:  No fatigue, insomnia, mood problems, depression  Endocrine:  No polyuria, polydipsia, cold/heat intolerance  Heme:  No petechiae, ecchymosis, easy bruisability  Skin:  No rash, tattoos, scars, edema    PHYSICAL EXAM:     GENERAL:  No acute distress, lying in bed, appears well nourished.   HEENT:  NCAT, no scleral icterus   CHEST:  no respiratory distress  HEART:  Regular rate and rhythm  ABDOMEN:  Soft, non-tender, non-distended, no masses  EXTREMITIES: No LE edema  SKIN:  No rash/erythema/ecchymoses/petechiae/wounds/abscess/warm/dry  NEURO:  Alert and oriented x 3, no tremors.     Vital Signs:  Vital Signs Last 24 Hrs  T(C): 36.6 (2023 16:12), Max: 38.3 (2023 00:40)  T(F): 97.8 (2023 16:12), Max: 100.9 (2023 00:40)  HR: 101 (2023 16:12) (93 - 107)  BP: 107/72 (2023 16:12) (98/66 - 110/75)  BP(mean): --  RR: 17 (2023 16:12) (17 - 18)  SpO2: 96% (2023 16:12) (95% - 96%)    Parameters below as of 2023 16:12  Patient On (Oxygen Delivery Method): room air      Daily     Daily     LABS:                        7.2    14.89 )-----------( 646      ( 2023 10:24 )             24.0     Mean Cell Volume: 80.3 fl (23 @ 10:24)        139  |  105  |  14  ----------------------------<  96  3.9   |  23  |  1.70<H>    Ca    8.1<L>      2023 07:05  Phos  3.3     -  Mg     1.7     -    TPro  5.1<L>  /  Alb  x   /  TBili  x   /  DBili  x   /  AST  x   /  ALT  x   /  AlkPhos  x       LIVER FUNCTIONS - ( 2023 07:08 )  Alb: x     / Pro: 5.1 g/dL / ALK PHOS: x     / ALT: x     / AST: x     / GGT: x             Urinalysis Basic - ( 2023 07:12 )    Color: Light Yellow / Appearance: Clear / S.020 / pH: x  Gluc: x / Ketone: Negative  / Bili: Negative / Urobili: Negative   Blood: x / Protein: 100 mg/dl / Nitrite: Negative   Leuk Esterase: Small / RBC: 13 /hpf / WBC 24 /HPF   Sq Epi: x / Non Sq Epi: 5 /hpf / Bacteria: Negative                              7.2    14.89 )-----------( 646      ( 2023 10:24 )             24.0                         7.0    14.54 )-----------( 614      ( 2023 07:12 )             22.8                         7.3    13.79 )-----------( 594      ( 2023 07:12 )             23.5                         7.3    13.97 )-----------( 622      ( 2023 06:59 )             24.7       Imaging:    ACC: [id number] EXAM:  CT CHEST IC   ORDERED BY: ANN HENSLEY     PROCEDURE DATE:  2023          INTERPRETATION:  CLINICAL INFORMATION: Nonresolving pneumonia. Fevers.   Chills.    COMPARISON: None.    CONTRAST/COMPLICATIONS:  IV Contrast: Omnipaque 350  40 cc administered   60 cc discarded  Oral Contrast: NONE  Complications: None reported at time of study completion    PROCEDURE:  CT of the Chest was performed.  Sagittal and coronal reformats were performed.    FINDINGS:    LUNGS AND AIRWAYS: Patent central airways.  There is chronic appearing   atelectasis of the right middle lobe. Patchy opacity of the right lower   lobes appreciated in addition to bowel centrilobular nodules. Mosaic   attenuation is appreciated, likely related to air-trapping versus other   small airways or small vessel disease.  PLEURA: No pleural effusion.  MEDIASTINUM AND EYAD: No pathologically enlarged lymph nodes. Calcified   hilar lymph nodes are noted..  VESSELS: The ascending aorta is borderline in size measuring 3.7 cm. The   main pulmonary artery is also borderline in size measuring 3 cm. This is   nonspecific though can be seen in the setting of pulmonary arterial   hypertension. Coronary artery calcifications are noted..  HEART: Heart size is top normal. No pericardial effusion.  CHEST WALL AND LOWER NECK: Within normal limits.  VISUALIZED UPPER ABDOMEN: Within normal limits.  BONES: Within normal limits.    IMPRESSION:  Findings compatible with bronchiolitis in associated pneumonia within the   right lower lobe.    Chronic appearing right middle lobe atelectasis.    Additional findings as above.

## 2023-04-04 NOTE — PROGRESS NOTE ADULT - PROBLEM SELECTOR PLAN 2
Pt. with iron deficiency anemia, TSAT noted to be 10%. Recommend IV Venofer 200mg daily x5 days (or until discharge) if BCx negative. Transfusion as per primary team. Monitor Hb.    If any questions, please feel free to contact me     Neal Bell  Nephrology Fellow  [facility] Pager: [phone]

## 2023-04-04 NOTE — PROGRESS NOTE ADULT - PROBLEM SELECTOR PLAN 5
Microcytic  No hx of bleeding, but previous hgb 12.7 last month - check occult blood   iron studies, reticulocyte count suggest BREA with some elevated ferritin may be reactive (still denies bleeding)  Normal B12 and folic acid (doesn't eat meat but supplements)    LDH/Hapto both high unlikely hemolyzing and bili normal  Keep active t/s, transfuse < 7  IV iron x5 days  Heme f/u  ?MM w/u- renal and heme following will check spep/upep  -d/w patient - has had colonoscopy in the past but not within the last 5 years - agreeable to inpatient colonoscopy if indicated - consulted GI

## 2023-04-04 NOTE — CONSULT NOTE ADULT - ASSESSMENT
Impression:     #Acute on chronic anemia  Baseline hgb around 12 (2023), on admission 9 which is trending down to 7, no overt signs of GI bleeding. Fam hx of colon cancer in both father and mother dx at age 82 and 72,  of colon cancer. Last colonoscopy 8  yrs ago which was normal. Used to iron tablets, last taken 10 yrs ago.   - iron studies show low iron sat, high TIBC and high ferritin in the setting of pneumonia.   - Differentials broad including both UGIB and LGIB, PUD, angioectasia, less likely colitis, malignancy etc.     #CAP  on Abx    Recommendations:   - Patient is currently not medically optimized for the procedure due to her underlying pneumonia and fevers, will re-assess for potential EGD and colonoscopy later in this week.   - continue with regular diet for today.   - place her on CLD tomorrow.   - Monitor CBC daily and transfuse if hgb < 7.     GI will continue to follow.     All recommendations are tentative until note is attested by an attending.     Dinorah Staley, PGY-4  Gastroenterology/Hepatology Fellow  Available on [employer] Teams  [phone] (Short Range Pager)  [phone] (Long Range Pager)    After 5pm, please contact the on-call GI fellow. [phone] Impression:     #Acute on chronic anemia  Baseline hgb around 12 (2023), on admission 9 which is trending down to 7, no overt signs of GI bleeding. Fam hx of colon cancer in both father and mother dx at age 82 and 72,  of colon cancer. Last colonoscopy 8  yrs ago which was normal. Used to iron tablets, last taken 10 yrs ago.   - iron studies show low iron sat, high TIBC and high ferritin in the setting of pneumonia.   - Differentials broad including both UGIB and LGIB, PUD, angioectasia, less likely colitis, malignancy etc.     #CAP  on Abx    Recommendations:   - Patient is currently not medically optimized for the procedure due to her underlying pneumonia and fevers, will re-assess for potential EGD and colonoscopy later in this week.   - continue with regular diet for today.   - Antibiotics per primary team  - Monitor CBC daily and transfuse if hgb < 7.     GI will continue to follow.     All recommendations are tentative until note is attested by an attending.     Dinorah Staley, PGY-4  Gastroenterology/Hepatology Fellow  Available on [employer] Teams  [phone] (Short Range Pager)  [phone] (Long Range Pager)    After 5pm, please contact the on-call GI fellow. [phone]

## 2023-04-04 NOTE — PROGRESS NOTE ADULT - PROBLEM SELECTOR PLAN 1
2/2 PNA, repeat lactate cleared, blood and urine cultures ngtd  Continue abx for resistant organism given abx use within last 90 days  plan to treat for 7 days, last day 4/6  Sputum culture

## 2023-04-05 LAB
ANION GAP SERPL CALC-SCNC: 8 MMOL/L — SIGNIFICANT CHANGE UP (ref 5–17)
BUN SERPL-MCNC: 14 MG/DL — SIGNIFICANT CHANGE UP (ref 7–23)
CALCIUM SERPL-MCNC: 8.4 MG/DL — SIGNIFICANT CHANGE UP (ref 8.4–10.5)
CHLORIDE SERPL-SCNC: 106 MMOL/L — SIGNIFICANT CHANGE UP (ref 96–108)
CO2 SERPL-SCNC: 23 MMOL/L — SIGNIFICANT CHANGE UP (ref 22–31)
CREAT SERPL-MCNC: 1.53 MG/DL — HIGH (ref 0.5–1.3)
CULTURE RESULTS: SIGNIFICANT CHANGE UP
CULTURE RESULTS: SIGNIFICANT CHANGE UP
EGFR: 35 ML/MIN/1.73M2 — LOW
GLUCOSE SERPL-MCNC: 123 MG/DL — HIGH (ref 70–99)
HCT VFR BLD CALC: 23.1 % — LOW (ref 34.5–45)
HCT VFR BLD CALC: 29.4 % — LOW (ref 34.5–45)
HGB BLD-MCNC: 6.9 G/DL — CRITICAL LOW (ref 11.5–15.5)
HGB BLD-MCNC: 9 G/DL — LOW (ref 11.5–15.5)
MAGNESIUM SERPL-MCNC: 1.7 MG/DL — SIGNIFICANT CHANGE UP (ref 1.6–2.6)
MCHC RBC-ENTMCNC: 23.7 PG — LOW (ref 27–34)
MCHC RBC-ENTMCNC: 24.8 PG — LOW (ref 27–34)
MCHC RBC-ENTMCNC: 29.9 GM/DL — LOW (ref 32–36)
MCHC RBC-ENTMCNC: 30.6 GM/DL — LOW (ref 32–36)
MCV RBC AUTO: 79.4 FL — LOW (ref 80–100)
MCV RBC AUTO: 81 FL — SIGNIFICANT CHANGE UP (ref 80–100)
NRBC # BLD: 0 /100 WBCS — SIGNIFICANT CHANGE UP (ref 0–0)
NRBC # BLD: 0 /100 WBCS — SIGNIFICANT CHANGE UP (ref 0–0)
PHOSPHATE SERPL-MCNC: 2.9 MG/DL — SIGNIFICANT CHANGE UP (ref 2.5–4.5)
PLATELET # BLD AUTO: 630 K/UL — HIGH (ref 150–400)
PLATELET # BLD AUTO: 641 K/UL — HIGH (ref 150–400)
POTASSIUM SERPL-MCNC: 3.7 MMOL/L — SIGNIFICANT CHANGE UP (ref 3.5–5.3)
POTASSIUM SERPL-SCNC: 3.7 MMOL/L — SIGNIFICANT CHANGE UP (ref 3.5–5.3)
RBC # BLD: 2.91 M/UL — LOW (ref 3.8–5.2)
RBC # BLD: 3.63 M/UL — LOW (ref 3.8–5.2)
RBC # FLD: 17 % — HIGH (ref 10.3–14.5)
RBC # FLD: 17.7 % — HIGH (ref 10.3–14.5)
SODIUM SERPL-SCNC: 137 MMOL/L — SIGNIFICANT CHANGE UP (ref 135–145)
SPECIMEN SOURCE: SIGNIFICANT CHANGE UP
SPECIMEN SOURCE: SIGNIFICANT CHANGE UP
WBC # BLD: 15.36 K/UL — HIGH (ref 3.8–10.5)
WBC # BLD: 15.81 K/UL — HIGH (ref 3.8–10.5)
WBC # FLD AUTO: 15.36 K/UL — HIGH (ref 3.8–10.5)
WBC # FLD AUTO: 15.81 K/UL — HIGH (ref 3.8–10.5)

## 2023-04-05 PROCEDURE — 99232 SBSQ HOSP IP/OBS MODERATE 35: CPT | Mod: GC

## 2023-04-05 PROCEDURE — 99233 SBSQ HOSP IP/OBS HIGH 50: CPT | Mod: GC

## 2023-04-05 PROCEDURE — 99232 SBSQ HOSP IP/OBS MODERATE 35: CPT

## 2023-04-05 RX ORDER — PANTOPRAZOLE SODIUM 20 MG/1
40 TABLET, DELAYED RELEASE ORAL
Refills: 0 | Status: DISCONTINUED | OUTPATIENT
Start: 2023-04-05 | End: 2023-04-13

## 2023-04-05 RX ORDER — SOD SULF/SODIUM/NAHCO3/KCL/PEG
4000 SOLUTION, RECONSTITUTED, ORAL ORAL ONCE
Refills: 0 | Status: COMPLETED | OUTPATIENT
Start: 2023-04-05 | End: 2023-04-05

## 2023-04-05 RX ADMIN — PIPERACILLIN AND TAZOBACTAM 25 GRAM(S): 4; .5 INJECTION, POWDER, LYOPHILIZED, FOR SOLUTION INTRAVENOUS at 05:36

## 2023-04-05 RX ADMIN — CHLORHEXIDINE GLUCONATE 1 APPLICATION(S): 213 SOLUTION TOPICAL at 12:30

## 2023-04-05 RX ADMIN — PIPERACILLIN AND TAZOBACTAM 25 GRAM(S): 4; .5 INJECTION, POWDER, LYOPHILIZED, FOR SOLUTION INTRAVENOUS at 14:09

## 2023-04-05 RX ADMIN — IRON SUCROSE 200 MILLIGRAM(S): 20 INJECTION, SOLUTION INTRAVENOUS at 23:30

## 2023-04-05 RX ADMIN — Medication 3 MILLILITER(S): at 05:36

## 2023-04-05 RX ADMIN — Medication 3 MILLILITER(S): at 23:30

## 2023-04-05 RX ADMIN — Medication 3 MILLILITER(S): at 12:31

## 2023-04-05 RX ADMIN — PIPERACILLIN AND TAZOBACTAM 25 GRAM(S): 4; .5 INJECTION, POWDER, LYOPHILIZED, FOR SOLUTION INTRAVENOUS at 22:26

## 2023-04-05 RX ADMIN — Medication 4000 MILLILITER(S): at 20:44

## 2023-04-05 RX ADMIN — Medication 325 MILLIGRAM(S): at 12:25

## 2023-04-05 RX ADMIN — Medication 3 MILLILITER(S): at 18:34

## 2023-04-05 NOTE — PHARMACOTHERAPY INTERVENTION NOTE - COMMENTS
Counseled patient on the following inpatient medications names (brand/generic), indication, and possible side effects:    MEDICATIONS  (STANDING):  albuterol/ipratropium for Nebulization 3 milliLiter(s) Nebulizer every 6 hours  ferrous sulfate 325 milliGRAM(s) Oral daily  iron sucrose Injectable 200 milliGRAM(s) IV Push every 24 hours  piperacillin/tazobactam IVPB.. 3.375 Gram(s) IV Intermittent every 8 hours    Provided medication guides. Discussed in depth the indication and side effects of the IV and PO iron supplementation and Zosyn. Patient says she feels a lot better since she's been here. Patient questions and concerns were answered and addressed. Patient demonstrated understanding.    Blanka Winn, PharmD  PGY1 Pharmacy Resident  Available on [employer] [id number]

## 2023-04-05 NOTE — PROGRESS NOTE ADULT - SUBJECTIVE AND OBJECTIVE BOX
Gastroenterology/Hepatology Progress Note      Interval Events:     Pt. reported she has been feeling well overall. Denied SOB, chest pain, nausea, vomiting, and chills. Last night, had temp 100.3. Tolerating CLD well.     Allergies:  No Known Allergies      Hospital Medications:  acetaminophen     Tablet .. 650 milliGRAM(s) Oral every 6 hours PRN  albuterol/ipratropium for Nebulization 3 milliLiter(s) Nebulizer every 6 hours  chlorhexidine 2% Cloths 1 Application(s) Topical daily  ferrous    sulfate 325 milliGRAM(s) Oral daily  iron sucrose Injectable 200 milliGRAM(s) IV Push every 24 hours  piperacillin/tazobactam IVPB.. 3.375 Gram(s) IV Intermittent every 8 hours      ROS: 14 point ROS negative unless otherwise state in subjective    PHYSICAL EXAM:   Vital Signs:  Vital Signs Last 24 Hrs  T(C): 37.6 (2023 05:31), Max: 37.9 (2023 05:24)  T(F): 99.7 (2023 05:31), Max: 100.3 (2023 05:24)  HR: 104 (2023 05:24) (101 - 114)  BP: 117/80 (2023 05:24) (107/72 - 117/80)  BP(mean): --  RR: 18 (2023 05:24) (17 - 18)  SpO2: 95% (2023 05:24) (95% - 96%)    Parameters below as of 2023 05:24  Patient On (Oxygen Delivery Method): room air      Daily     Daily Weight in k.7 (2023 08:27)    PHYSICAL EXAM:     GENERAL:  No acute distress, lying in bed, appears well nourished.   HEENT:  NCAT, no scleral icterus   CHEST:  no respiratory distress  ABDOMEN:  Soft, non-tender, non-distended, no masses  EXTREMITIES: No LE edema  NEURO:  Alert and oriented x 3, no tremors.     LABS:                        6.9    15.36 )-----------( 641      ( 2023 06:49 )             23.1     Mean Cell Volume: 79.4 fl (- @ 06:49)        137  |  106  |  14  ----------------------------<  123<H>  3.7   |  23  |  1.53<H>    Ca    8.4      2023 06:49  Phos  2.9     04-05  Mg     1.7     -        Imaging:        ACC: [id number] EXAM:  CT CHEST IC   ORDERED BY: ANN HENSLEY     PROCEDURE DATE:  2023          INTERPRETATION:  CLINICAL INFORMATION: Nonresolving pneumonia. Fevers.   Chills.    COMPARISON: None.    CONTRAST/COMPLICATIONS:  IV Contrast: Omnipaque 350  40 cc administered   60 cc discarded  Oral Contrast: NONE  Complications: None reported at time of study completion    PROCEDURE:  CT of the Chest was performed.  Sagittal and coronal reformats were performed.    FINDINGS:    LUNGS AND AIRWAYS: Patent central airways.  There is chronic appearing   atelectasis of the right middle lobe. Patchy opacity of the right lower   lobes appreciated in addition to bowel centrilobular nodules. Mosaic   attenuation is appreciated, likely related to air-trapping versus other   small airways or small vessel disease.  PLEURA: No pleural effusion.  MEDIASTINUM AND EYAD: No pathologically enlarged lymph nodes. Calcified   hilar lymph nodes are noted..  VESSELS: The ascending aorta is borderline in size measuring 3.7 cm. The   main pulmonary artery is also borderline in size measuring 3 cm. This is   nonspecific though can be seen in the setting of pulmonary arterial   hypertension. Coronary artery calcifications are noted..  HEART: Heart size is top normal. No pericardial effusion.  CHEST WALL AND LOWER NECK: Within normal limits.  VISUALIZED UPPER ABDOMEN: Within normal limits.  BONES: Within normal limits.    IMPRESSION:  Findings compatible with bronchiolitis in associated pneumonia within the   right lower lobe.    Chronic appearing right middle lobe atelectasis.    Additional findings as above.

## 2023-04-05 NOTE — PROGRESS NOTE ADULT - SUBJECTIVE AND OBJECTIVE BOX
INTERVAL HPI/OVERNIGHT EVENTS:  Patient S&E at bedside. No complaints at this time. No F/C, CP, SOB, abdominal pain, N/V, rash, or edema    ****INCOMPLETE****    VITAL SIGNS:  T(F): 99.7 (04-05-23 @ 05:31)  HR: 104 (04-05-23 @ 05:24)  BP: 117/80 (04-05-23 @ 05:24)  RR: 18 (04-05-23 @ 05:24)  SpO2: 95% (04-05-23 @ 05:24)  Wt(kg): --    PHYSICAL EXAM:    Constitutional: NAD  Eyes: EOMI, sclera non-icteric  Neck: supple  Respiratory: no increased WOB, CTAB/L  Cardiovascular: RRR, S1, S2, no m/g/r  Gastrointestinal: soft, NTND, no masses palpable, no HSM  Extremities: no c/c/e  Neurological: AAOx3    MEDICATIONS  (STANDING):  albuterol/ipratropium for Nebulization 3 milliLiter(s) Nebulizer every 6 hours  chlorhexidine 2% Cloths 1 Application(s) Topical daily  ferrous    sulfate 325 milliGRAM(s) Oral daily  iron sucrose Injectable 200 milliGRAM(s) IV Push every 24 hours  piperacillin/tazobactam IVPB.. 3.375 Gram(s) IV Intermittent every 8 hours    MEDICATIONS  (PRN):  acetaminophen     Tablet .. 650 milliGRAM(s) Oral every 6 hours PRN Temp greater or equal to 38C (100.4F), Mild Pain (1 - 3)      LABS:                        6.9    15.36 )-----------( 641      ( 05 Apr 2023 06:49 )             23.1     04-05    137  |  106  |  14  ----------------------------<  123<H>  3.7   |  23  |  1.53<H>    Ca    8.4      05 Apr 2023 06:49  Phos  2.9     04-05  Mg     1.7     04-05            RADIOLOGY & ADDITIONAL TESTS:   INTERVAL HPI/OVERNIGHT EVENTS:  Patient S&E at bedside. No complaints at this time. No F/C, CP, SOB, abdominal pain, N/V, rash, or edema      VITAL SIGNS:  T(F): 99.7 (04-05-23 @ 05:31)  HR: 104 (04-05-23 @ 05:24)  BP: 117/80 (04-05-23 @ 05:24)  RR: 18 (04-05-23 @ 05:24)  SpO2: 95% (04-05-23 @ 05:24)  Wt(kg): --    PHYSICAL EXAM:    Constitutional: NAD  Eyes: EOMI, sclera non-icteric  Neck: supple  Respiratory: no increased WOB, CTAB/L  Cardiovascular: RRR, S1, S2, no m/g/r  Gastrointestinal: soft, NTND, no masses palpable, no HSM  Extremities: no c/c/e  Neurological: AAOx3    MEDICATIONS  (STANDING):  albuterol/ipratropium for Nebulization 3 milliLiter(s) Nebulizer every 6 hours  chlorhexidine 2% Cloths 1 Application(s) Topical daily  ferrous    sulfate 325 milliGRAM(s) Oral daily  iron sucrose Injectable 200 milliGRAM(s) IV Push every 24 hours  piperacillin/tazobactam IVPB.. 3.375 Gram(s) IV Intermittent every 8 hours    MEDICATIONS  (PRN):  acetaminophen     Tablet .. 650 milliGRAM(s) Oral every 6 hours PRN Temp greater or equal to 38C (100.4F), Mild Pain (1 - 3)      LABS:                        6.9    15.36 )-----------( 641      ( 05 Apr 2023 06:49 )             23.1     04-05    137  |  106  |  14  ----------------------------<  123<H>  3.7   |  23  |  1.53<H>    Ca    8.4      05 Apr 2023 06:49  Phos  2.9     04-05  Mg     1.7     04-05            RADIOLOGY & ADDITIONAL TESTS:

## 2023-04-05 NOTE — PROGRESS NOTE ADULT - PROBLEM SELECTOR PLAN 4
Downtrended but delta change is 15  Pt also complaining of SOB and weakness - however TTE WNL  EKG sinus tachycardia no ischemic changes

## 2023-04-05 NOTE — PROGRESS NOTE ADULT - SUBJECTIVE AND OBJECTIVE BOX
[facility] Division of Hospital Medicine  Lilli Burns DO  Available on Teams [employer]    Patient is a 72y old  Female who presents with a chief complaint of Cough (04 Apr 2023 18:03)      SUBJECTIVE / OVERNIGHT EVENTS: none. Patient feels better. Sicne mid Feb has not been getting out of bed except for when "necessary". Having regular BMs.   ADDITIONAL REVIEW OF SYSTEMS: negative    MEDICATIONS  (STANDING):  albuterol/ipratropium for Nebulization 3 milliLiter(s) Nebulizer every 6 hours  chlorhexidine 2% Cloths 1 Application(s) Topical daily  ferrous    sulfate 325 milliGRAM(s) Oral daily  iron sucrose Injectable 200 milliGRAM(s) IV Push every 24 hours  piperacillin/tazobactam IVPB.. 3.375 Gram(s) IV Intermittent every 8 hours    MEDICATIONS  (PRN):  acetaminophen     Tablet .. 650 milliGRAM(s) Oral every 6 hours PRN Temp greater or equal to 38C (100.4F), Mild Pain (1 - 3)      CAPILLARY BLOOD GLUCOSE        I&O's Summary    04 Apr 2023 07:01  -  05 Apr 2023 07:00  --------------------------------------------------------  IN: 640 mL / OUT: 0 mL / NET: 640 mL        PHYSICAL EXAM:  Vital Signs Last 24 Hrs  T(C): 37.6 (05 Apr 2023 05:31), Max: 37.9 (05 Apr 2023 05:24)  T(F): 99.7 (05 Apr 2023 05:31), Max: 100.3 (05 Apr 2023 05:24)  HR: 104 (05 Apr 2023 05:24) (101 - 114)  BP: 117/80 (05 Apr 2023 05:24) (107/72 - 117/80)  BP(mean): --  RR: 18 (05 Apr 2023 05:24) (17 - 18)  SpO2: 95% (05 Apr 2023 05:24) (95% - 96%)    Parameters below as of 05 Apr 2023 05:24  Patient On (Oxygen Delivery Method): room air      CONSTITUTIONAL: Well-groomed, in no apparent distress  EYES: No conjunctival or scleral injection, non-icteric; PERRLA and symmetric  ENMT: No external nasal lesions; no pharyngeal injection or exudates, oral mucosa with moist membranes  NECK: Trachea midline without palpable neck mass; thyroid not enlarged and non-tender  RESPIRATORY: Breathing comfortably; lungs CTA without wheeze/rhonchi/rales  CARDIOVASCULAR: +S1S2, RRR, +systolic murmur loudest at RUSB; pedal pulses full and symmetric; no lower extremity edema  GASTROINTESTINAL: No palpable masses or tenderness, +BS throughout, no rebound/guarding; no hepatosplenomegaly; no hernia palpated  MUSCULOSKELETAL: no digital clubbing or cyanosis; no paraspinal tenderness; normal strength and tone of extremities  SKIN: No rashes or ulcers noted; no subcutaneous nodules or induration palpable  NEUROLOGIC: CN II-XII intact; sensation intact in LEs b/l to light touch  PSYCHIATRIC: A+O x 3; mood and affect appropriate; appropriate insight and judgment    LABS:                        6.9    15.36 )-----------( 641      ( 05 Apr 2023 06:49 )             23.1     04-05    137  |  106  |  14  ----------------------------<  123<H>  3.7   |  23  |  1.53<H>    Ca    8.4      05 Apr 2023 06:49  Phos  2.9     04-05  Mg     1.7     04-05                Culture - Blood (collected 03 Apr 2023 07:33)  Source: .Blood Blood-Venous  Preliminary Report (04 Apr 2023 12:02):    No growth to date.    Culture - Blood (collected 03 Apr 2023 01:30)  Source: .Blood Blood-Peripheral  Preliminary Report (04 Apr 2023 05:01):    No growth to date.

## 2023-04-05 NOTE — PROGRESS NOTE ADULT - ASSESSMENT
72 year old female who reports having a life long history of anemia (previously on iron and B12 supplementation) admitted for sepsis in the setting of PNA. Now found to have thrombocytosis and worsening anemia, for which hematology is being consulted.     Anemia/Thrombocytosis  #Pt presented with Hgb of 9.0 now downtrended to 7.3.Pt with documented Hgb of 12 in February however unclear baseline as number is isolated and patient reports hx of chronic anemia. B12 and folate noted to be normal. Iron studies revealed low iron, low TIBC and elevated ferritin concerning for inflammatory process, however patient with microcytosis and over picture concerning for  a concurrent iron deficiency component. Iron deficit was 1102mg as per Ganzoni equation.  #Pt with thrombocytosis to the 600s, appears to be improving. Suspect this is reactive thrombocytosis in the setting of inflammation and iron deficiency.  -Recommend IV iron 200mg daily for 5 days  -Monitor CBC with differential daily and transfuse to maintain Hb >7, and platelet count >10, >20 if febrile, and >50 prior to procedures.       ****INCOMPLETE****  73 yo F with a lifelong PMH of anemia (previously on iron and B12 supplementation) p/w chills, weakness, and cough, admitted with sepsis in the setting of PNA. Hematology consulted for thrombocytosis and worsening anemia.    #Anemia / Thrombocytosis  - Pt initially presented with Hb of 9.0 (was 12 in February), but has been downtrending since admission  - Patient also has had thrombocytosis of ~600s  - However, unclear baseline, as there are no labs prior to February and patient reports a history of chronic anemia  - B12/folate normal  - Iron studies showed low iron, low TIBC, and elevated ferritin concerning for inflammatory process, but patient with microcytosis. Iron deficit was 1102 mg as per Ganzoni equation.  - Given iron deficiency, being given IV iron 200 mg daily for 5 days (4/2 - 4/6)  - Given lab findings, suspect that thrombocytosis is reactive from iron deficiency anemia, and may take a couple weeks (as well as Hb) to improve with iron  - However, if platelets and Hb are not improving, could consider further workup as an outpatient  - SPEP result pending, but serum immunofixation shows no monoclonal band  - However, given declining Hb, need to r/o bleed. Patient is pending EGD/colonoscopy tomorrow 4/6. Will f/u results  - In the meantime, monitor CBC with differential daily and transfuse to maintain Hb >7, and platelet count >10 (>20 if febrile, and >50 prior to procedures)      Aryles Hedjar, MD, PGY-5  Hematology/Oncology Fellow  [facility]  Pager: [phone]  After 5PM and on weekends and holidays, please call the inpatient fellow on call.

## 2023-04-05 NOTE — PROGRESS NOTE ADULT - PROBLEM SELECTOR PLAN 5
Microcytic  No hx of bleeding, but previous hgb 12.7 last month - check occult blood   iron studies, reticulocyte count suggest BREA with some elevated ferritin may be reactive (still denies bleeding)  Normal B12 and folic acid (doesn't eat meat but supplements)    LDH/Hapto both high unlikely hemolyzing and bili normal  Keep active t/s, transfuse < 7 (1U given 4/5)  IV iron x5 days  Heme f/u  ?MM w/u- renal and heme following will check spep/upep  -GI following, plan for EGD/colonoscopy 4/6

## 2023-04-05 NOTE — PROGRESS NOTE ADULT - ASSESSMENT
Impression:     #Acute on chronic anemia  Baseline hgb around 12 (2023), on admission 9 which is trending down to 7, no overt signs of GI bleeding. Fam hx of colon cancer in both father and mother dx at age 82 and 72,  of colon cancer. Last colonoscopy 8  yrs ago which was normal. Used to iron tablets, last taken 10 yrs ago.   - iron studies show low iron sat, high TIBC and high ferritin in the setting of pneumonia.   - Differentials broad including both UGIB and LGIB, PUD, angioectasia, less likely colitis, malignancy etc.     #CAP  on Abx    Recommendations:   - Will proceed w/ colonoscopy and potential EGD tomorrow.   - Continue with CLD for now.   - Make him NPO after midnight.  - Will order GoLytely prep this evening.   - Antibiotics per primary team  - Monitor CBC daily and transfuse if hgb < 7.  - Please obtain early 3 AM labs prior to the procedure    GI will continue to follow.     All recommendations are tentative until note is attested by an attending.     Dinorah Staley, PGY-4  Gastroenterology/Hepatology Fellow  Available on [employer] Teams  [phone] (Short Range Pager)  [phone] (Long Range Pager)    After 5pm, please contact the on-call GI fellow. [phone]    Impression:     #Acute on chronic anemia  Baseline hgb around 12 (2023), on admission 9 which is trending down to 7, no overt signs of GI bleeding. Fam hx of colon cancer in both father and mother dx at age 82 and 72,  of colon cancer. Last colonoscopy 8  yrs ago which was normal. Used to iron tablets, last taken 10 yrs ago.   - iron studies show low iron sat, high TIBC and high ferritin in the setting of pneumonia.   - Differentials broad including both UGIB and LGIB, PUD, angioectasia, less likely colitis, malignancy etc.     #CAP  on Abx    Recommendations:   - Will proceed w/ colonoscopy and potential EGD tomorrow, as pt is stable, not on oxygen, and feels well  - Continue with CLD for now.   - Make him NPO after midnight.  - Will order GoLytely prep this evening.   - Antibiotics per primary team  - Empiric PPI for now  - Monitor CBC daily and transfuse if hgb < 7.  - Please obtain early 3 AM labs prior to the procedure    GI will continue to follow.     All recommendations are tentative until note is attested by an attending.     Dinorah Staley, PGY-4  Gastroenterology/Hepatology Fellow  Available on Microsoft Teams  [phone] (Short Range Pager)  [phone] (Long Range Pager)    After 5pm, please contact the on-call GI fellow. [phone]

## 2023-04-06 LAB
ALBUMIN SERPL ELPH-MCNC: 2.2 G/DL — LOW (ref 3.3–5)
ALP SERPL-CCNC: 111 U/L — SIGNIFICANT CHANGE UP (ref 40–120)
ALT FLD-CCNC: 30 U/L — SIGNIFICANT CHANGE UP (ref 10–45)
ANION GAP SERPL CALC-SCNC: 10 MMOL/L — SIGNIFICANT CHANGE UP (ref 5–17)
APTT BLD: 29.6 SEC — SIGNIFICANT CHANGE UP (ref 27.5–35.5)
AST SERPL-CCNC: 42 U/L — HIGH (ref 10–40)
BILIRUB SERPL-MCNC: 0.6 MG/DL — SIGNIFICANT CHANGE UP (ref 0.2–1.2)
BUN SERPL-MCNC: 11 MG/DL — SIGNIFICANT CHANGE UP (ref 7–23)
CALCIUM SERPL-MCNC: 8.1 MG/DL — LOW (ref 8.4–10.5)
CHLORIDE SERPL-SCNC: 105 MMOL/L — SIGNIFICANT CHANGE UP (ref 96–108)
CO2 SERPL-SCNC: 23 MMOL/L — SIGNIFICANT CHANGE UP (ref 22–31)
CREAT SERPL-MCNC: 1.37 MG/DL — HIGH (ref 0.5–1.3)
EGFR: 41 ML/MIN/1.73M2 — LOW
GLUCOSE SERPL-MCNC: 113 MG/DL — HIGH (ref 70–99)
HCT VFR BLD CALC: 26.8 % — LOW (ref 34.5–45)
HGB BLD-MCNC: 8.2 G/DL — LOW (ref 11.5–15.5)
INR BLD: 1.26 RATIO — HIGH (ref 0.88–1.16)
MAGNESIUM SERPL-MCNC: 1.8 MG/DL — SIGNIFICANT CHANGE UP (ref 1.6–2.6)
MCHC RBC-ENTMCNC: 24.5 PG — LOW (ref 27–34)
MCHC RBC-ENTMCNC: 30.6 GM/DL — LOW (ref 32–36)
MCV RBC AUTO: 80 FL — SIGNIFICANT CHANGE UP (ref 80–100)
NRBC # BLD: 0 /100 WBCS — SIGNIFICANT CHANGE UP (ref 0–0)
PHOSPHATE SERPL-MCNC: 2.6 MG/DL — SIGNIFICANT CHANGE UP (ref 2.5–4.5)
PLATELET # BLD AUTO: 600 K/UL — HIGH (ref 150–400)
POTASSIUM SERPL-MCNC: 4.1 MMOL/L — SIGNIFICANT CHANGE UP (ref 3.5–5.3)
POTASSIUM SERPL-SCNC: 4.1 MMOL/L — SIGNIFICANT CHANGE UP (ref 3.5–5.3)
PROT SERPL-MCNC: 5.8 G/DL — LOW (ref 6–8.3)
PROTHROM AB SERPL-ACNC: 14.6 SEC — HIGH (ref 10.5–13.4)
RBC # BLD: 3.35 M/UL — LOW (ref 3.8–5.2)
RBC # FLD: 17.3 % — HIGH (ref 10.3–14.5)
SODIUM SERPL-SCNC: 138 MMOL/L — SIGNIFICANT CHANGE UP (ref 135–145)
WBC # BLD: 16.22 K/UL — HIGH (ref 3.8–10.5)
WBC # FLD AUTO: 16.22 K/UL — HIGH (ref 3.8–10.5)

## 2023-04-06 PROCEDURE — 43235 EGD DIAGNOSTIC BRUSH WASH: CPT | Mod: GC

## 2023-04-06 PROCEDURE — 45378 DIAGNOSTIC COLONOSCOPY: CPT | Mod: GC

## 2023-04-06 PROCEDURE — 99232 SBSQ HOSP IP/OBS MODERATE 35: CPT

## 2023-04-06 DEVICE — NET RETRV ROT ROTH 2.5MMX230CM: Type: IMPLANTABLE DEVICE | Status: FUNCTIONAL

## 2023-04-06 RX ORDER — LIDOCAINE HCL 20 MG/ML
4 VIAL (ML) INJECTION ONCE
Refills: 0 | Status: DISCONTINUED | OUTPATIENT
Start: 2023-04-06 | End: 2023-04-09

## 2023-04-06 RX ADMIN — PIPERACILLIN AND TAZOBACTAM 25 GRAM(S): 4; .5 INJECTION, POWDER, LYOPHILIZED, FOR SOLUTION INTRAVENOUS at 06:14

## 2023-04-06 RX ADMIN — PIPERACILLIN AND TAZOBACTAM 25 GRAM(S): 4; .5 INJECTION, POWDER, LYOPHILIZED, FOR SOLUTION INTRAVENOUS at 22:12

## 2023-04-06 RX ADMIN — Medication 3 MILLILITER(S): at 18:43

## 2023-04-06 RX ADMIN — PIPERACILLIN AND TAZOBACTAM 25 GRAM(S): 4; .5 INJECTION, POWDER, LYOPHILIZED, FOR SOLUTION INTRAVENOUS at 15:18

## 2023-04-06 RX ADMIN — IRON SUCROSE 200 MILLIGRAM(S): 20 INJECTION, SOLUTION INTRAVENOUS at 22:57

## 2023-04-06 RX ADMIN — Medication 3 MILLILITER(S): at 06:13

## 2023-04-06 RX ADMIN — Medication 3 MILLILITER(S): at 23:40

## 2023-04-06 RX ADMIN — CHLORHEXIDINE GLUCONATE 1 APPLICATION(S): 213 SOLUTION TOPICAL at 11:47

## 2023-04-06 RX ADMIN — Medication 325 MILLIGRAM(S): at 11:49

## 2023-04-06 RX ADMIN — Medication 3 MILLILITER(S): at 11:48

## 2023-04-06 NOTE — PROGRESS NOTE ADULT - NSPROGADDITIONALINFOA_GEN_ALL_CORE
Plan discussed with ***    Lilli Burns, DO  Available on Teams sara    Update given to patient's brother Henry yesterday. Will call again today.    Dispo in 1-2 days. P4D after results of capsule endoscopy come back. Await PT recs.

## 2023-04-06 NOTE — CHART NOTE - NSCHARTNOTEFT_GEN_A_CORE
Nephrology following for MINERVA suspected in setting of IV contrast use and hypotension.   Renal function has been improving down to 1.37.   Optimize hemodynamics. Avoid further nephrotoxic agents. Monitor labs and UOP.     Nephrology will sign off at this time.     If any questions, please feel free to contact me     Neal Bell  Nephrology Fellow  [facility] Pager: [phone]

## 2023-04-06 NOTE — PROGRESS NOTE ADULT - SUBJECTIVE AND OBJECTIVE BOX
[facility] Division of Hospital Medicine  Lilli Burns DO  Available on Teams Pattie    Patient is a 72y old  Female who presents with a chief complaint of Cough (05 Apr 2023 15:31)      SUBJECTIVE / OVERNIGHT EVENTS: none. Patient has no complaints.   ADDITIONAL REVIEW OF SYSTEMS: negative    MEDICATIONS  (STANDING):  albuterol/ipratropium for Nebulization 3 milliLiter(s) Nebulizer every 6 hours  chlorhexidine 2% Cloths 1 Application(s) Topical daily  ferrous    sulfate 325 milliGRAM(s) Oral daily  iron sucrose Injectable 200 milliGRAM(s) IV Push every 24 hours  lidocaine 4% Injection for Nebulization 4 milliLiter(s) Nebulizer once  pantoprazole    Tablet 40 milliGRAM(s) Oral before breakfast  piperacillin/tazobactam IVPB.. 3.375 Gram(s) IV Intermittent every 8 hours    MEDICATIONS  (PRN):  acetaminophen     Tablet .. 650 milliGRAM(s) Oral every 6 hours PRN Temp greater or equal to 38C (100.4F), Mild Pain (1 - 3)      CAPILLARY BLOOD GLUCOSE        I&O's Summary    05 Apr 2023 07:01  -  06 Apr 2023 07:00  --------------------------------------------------------  IN: 220 mL / OUT: 300 mL / NET: -80 mL        PHYSICAL EXAM:  Vital Signs Last 24 Hrs  T(C): 36.8 (06 Apr 2023 08:08), Max: 37.3 (05 Apr 2023 16:16)  T(F): 98 (06 Apr 2023 08:06), Max: 99.1 (05 Apr 2023 16:16)  HR: 92 (06 Apr 2023 11:53) (84 - 105)  BP: 128/82 (06 Apr 2023 11:53) (93/58 - 136/79)  BP(mean): 93 (06 Apr 2023 08:08) (93 - 93)  RR: 19 (06 Apr 2023 09:41) (15 - 19)  SpO2: 97% (06 Apr 2023 11:53) (94% - 99%)    Parameters below as of 06 Apr 2023 11:53  Patient On (Oxygen Delivery Method): room air    CONSTITUTIONAL: Well-groomed, in no apparent distress  EYES: No conjunctival or scleral injection, non-icteric; PERRLA and symmetric  ENMT: No external nasal lesions; no pharyngeal injection or exudates, oral mucosa with moist membranes  NECK: Trachea midline without palpable neck mass; thyroid not enlarged and non-tender  RESPIRATORY: Breathing comfortably; lungs CTA without wheeze/rhonchi/rales  CARDIOVASCULAR: +S1S2, RRR, no murmurs, pedal pulses full and symmetric; no lower extremity edema  GASTROINTESTINAL: No palpable masses or tenderness, +BS throughout, no rebound/guarding; no hepatosplenomegaly; no hernia palpated  MUSCULOSKELETAL: no digital clubbing or cyanosis; no paraspinal tenderness; normal strength and tone of extremities  SKIN: No rashes or ulcers noted; no subcutaneous nodules or induration palpable  NEUROLOGIC: CN II-XII intact; sensation intact in LEs b/l to light touch  PSYCHIATRIC: A+O x 3; mood and affect appropriate; appropriate insight and judgment    LABS:                        8.2    16.22 )-----------( 600      ( 06 Apr 2023 04:38 )             26.8     04-06    138  |  105  |  11  ----------------------------<  113<H>  4.1   |  23  |  1.37<H>    Ca    8.1<L>      06 Apr 2023 04:38  Phos  2.6     04-06  Mg     1.8     04-06    TPro  5.8<L>  /  Alb  2.2<L>  /  TBili  0.6  /  DBili  x   /  AST  42<H>  /  ALT  30  /  AlkPhos  111  04-06    PT/INR - ( 06 Apr 2023 04:38 )   PT: 14.6 sec;   INR: 1.26 ratio         PTT - ( 06 Apr 2023 04:38 )  PTT:29.6 sec

## 2023-04-06 NOTE — PHYSICAL THERAPY INITIAL EVALUATION ADULT - PERTINENT HX OF CURRENT PROBLEM, REHAB EVAL
73 yo F with a lifelong PMH of anemia (previously on iron and B12 supplementation) p/w chills, weakness, and cough, admitted with sepsis in the setting of PNA. Hematology consulted for thrombocytosis and worsening anemia.    CT chest: Findings compatible with bronchiolitis in associated pneumonia within the right lower lobe. Chronic appearing right middle lobe atelectasis. CXR: Right lower lung patchy opacities. US renal: No hydronephrosis. Additional findings as described.

## 2023-04-06 NOTE — PROGRESS NOTE ADULT - PROBLEM SELECTOR PLAN 6
FeNA 1% intrinsic, due to ATN, now resolved  US showing echogenic kidneys no hydro  Renal consult appreciated   BMP QD  s/p IVF

## 2023-04-06 NOTE — PRE PROCEDURE NOTE - PRE PROCEDURE EVALUATION
Attending Physician:  Dr Norwood                          Procedure: EGD/colonoscopy     Indication for Procedure: anemia   ________________________________________________________  PAST MEDICAL & SURGICAL HISTORY:  No pertinent past medical history        ALLERGIES:  No Known Allergies    HOME MEDICATIONS:  Echinacea: 1 tablet once daily  Elderberry: 1 tablet once daily  Vitamin D3 25 mcg (1000 intl units) oral tablet: 1 tab(s) orally once a day  zinc (as acetate) 50 mg oral capsule: 1 tab(s) orally once a day    AICD/PPM: [ ] yes   [x ] no    PERTINENT LAB DATA:                        8.2    16.22 )-----------( 600      ( 06 Apr 2023 04:38 )             26.8     04-06    138  |  105  |  11  ----------------------------<  113<H>  4.1   |  23  |  1.37<H>    Ca    8.1<L>      06 Apr 2023 04:38  Phos  2.6     04-06  Mg     1.8     04-06    TPro  5.8<L>  /  Alb  2.2<L>  /  TBili  0.6  /  DBili  x   /  AST  42<H>  /  ALT  30  /  AlkPhos  111  04-06    PT/INR - ( 06 Apr 2023 04:38 )   PT: 14.6 sec;   INR: 1.26 ratio         PTT - ( 06 Apr 2023 04:38 )  PTT:29.6 sec            PHYSICAL EXAMINATION:    T(C): 36.8  HR: 105  BP: 125/80  RR: 18  SpO2: 96%    Constitutional: NAD    Neck:  No JVD  Respiratory: CTAB/L  Cardiovascular: RRR  Gastrointestinal: BS+, soft, NT/ND  Extremities: No peripheral edema  Neurological: A/O x 4, no focal deficits        COMMENTS:    The patient is a suitable candidate for the planned procedure unless box checked [ ]  No, explain:

## 2023-04-06 NOTE — PHYSICAL THERAPY INITIAL EVALUATION ADULT - NSPTDMEREC_GEN_A_CORE
Pt would require rolling walking due to decreased balance and strength during ambulation for discharge.

## 2023-04-06 NOTE — PHYSICAL THERAPY INITIAL EVALUATION ADULT - LEVEL OF INDEPENDENCE: GAIT, REHAB EVAL
amb 25ft performed using RW with [place], LOB x 3 with assist to correct, pt amb an additional 25ft without AD with CGA prog to Sup.

## 2023-04-06 NOTE — PROGRESS NOTE ADULT - PROBLEM SELECTOR PLAN 7
Lovenox on hold for dropping hgb can resume if stable  Keep Mg >2 K> 4  Regular diet    PT consult to determine functional status

## 2023-04-06 NOTE — PHYSICAL THERAPY INITIAL EVALUATION ADULT - ADDITIONAL COMMENTS
Pt states independent in ADLs prior to hospitalization.  Patient lives a few months in [place] and also lives in [place].  Patient is currently staying with her significant other's sister. Patient is living in basement with signficiant other.  Patient is independent with ambulation.  Patient has no DME or services. Patient stated once she feels better she will go down to [place]. Pt states independent in ADLs prior to hospitalization.  Patient lives a few months in [place] and also lives in [place].  Patient is currently staying with her significant other's sister. Patient is living in basement with significant other, 1 flight down to enter.  Patient is independent with ambulation.  Patient has no DME or services. Patient stated once she feels better she will go down to [place].

## 2023-04-06 NOTE — PROGRESS NOTE ADULT - PROBLEM SELECTOR PLAN 5
Microcytic  No hx of bleeding, but previous hgb 12.7 last month - check occult blood   iron studies, reticulocyte count suggest BREA with some elevated ferritin may be reactive (still denies bleeding)  Normal B12 and folic acid (doesn't eat meat but supplements)    LDH/Hapto both high unlikely hemolyzing and bili normal  Keep active t/s, transfuse < 7 (1U given 4/5)  IV iron x5 days  Heme f/u  ?MM w/u- renal and heme following will check spep/upep  -GI following, s/p EGD/colonoscopy both no e/o bleeding, except gastritis, plan for capsule endoscopy

## 2023-04-07 DIAGNOSIS — U07.1 COVID-19: ICD-10-CM

## 2023-04-07 LAB
% ALBUMIN: 35.9 % — SIGNIFICANT CHANGE UP
% ALPHA 1: 11.6 % — SIGNIFICANT CHANGE UP
% ALPHA 2: 16.6 % — SIGNIFICANT CHANGE UP
% BETA: 11.2 % — SIGNIFICANT CHANGE UP
% GAMMA: 24.7 % — SIGNIFICANT CHANGE UP
ALBUMIN SERPL ELPH-MCNC: 1.8 G/DL — LOW (ref 3.6–5.5)
ALBUMIN/GLOB SERPL ELPH: 0.5 RATIO — SIGNIFICANT CHANGE UP
ALPHA1 GLOB SERPL ELPH-MCNC: 0.6 G/DL — HIGH (ref 0.1–0.4)
ALPHA2 GLOB SERPL ELPH-MCNC: 0.8 G/DL — SIGNIFICANT CHANGE UP (ref 0.5–1)
ANION GAP SERPL CALC-SCNC: 11 MMOL/L — SIGNIFICANT CHANGE UP (ref 5–17)
B-GLOBULIN SERPL ELPH-MCNC: 0.6 G/DL — SIGNIFICANT CHANGE UP (ref 0.5–1)
BUN SERPL-MCNC: 12 MG/DL — SIGNIFICANT CHANGE UP (ref 7–23)
CALCIUM SERPL-MCNC: 8.1 MG/DL — LOW (ref 8.4–10.5)
CHLORIDE SERPL-SCNC: 105 MMOL/L — SIGNIFICANT CHANGE UP (ref 96–108)
CO2 SERPL-SCNC: 21 MMOL/L — LOW (ref 22–31)
CREAT SERPL-MCNC: 1.45 MG/DL — HIGH (ref 0.5–1.3)
EGFR: 38 ML/MIN/1.73M2 — LOW
GAMMA GLOBULIN: 1.3 G/DL — SIGNIFICANT CHANGE UP (ref 0.6–1.6)
GLUCOSE SERPL-MCNC: 117 MG/DL — HIGH (ref 70–99)
HCT VFR BLD CALC: 25.3 % — LOW (ref 34.5–45)
HGB BLD-MCNC: 7.9 G/DL — LOW (ref 11.5–15.5)
INTERPRETATION SERPL IFE-IMP: SIGNIFICANT CHANGE UP
MAGNESIUM SERPL-MCNC: 1.8 MG/DL — SIGNIFICANT CHANGE UP (ref 1.6–2.6)
MCHC RBC-ENTMCNC: 24.7 PG — LOW (ref 27–34)
MCHC RBC-ENTMCNC: 31.2 GM/DL — LOW (ref 32–36)
MCV RBC AUTO: 79.1 FL — LOW (ref 80–100)
NRBC # BLD: 0 /100 WBCS — SIGNIFICANT CHANGE UP (ref 0–0)
PHOSPHATE SERPL-MCNC: 2.8 MG/DL — SIGNIFICANT CHANGE UP (ref 2.5–4.5)
PLATELET # BLD AUTO: 598 K/UL — HIGH (ref 150–400)
POTASSIUM SERPL-MCNC: 3.6 MMOL/L — SIGNIFICANT CHANGE UP (ref 3.5–5.3)
POTASSIUM SERPL-SCNC: 3.6 MMOL/L — SIGNIFICANT CHANGE UP (ref 3.5–5.3)
PROT PATTERN SERPL ELPH-IMP: SIGNIFICANT CHANGE UP
RAPID RVP RESULT: SIGNIFICANT CHANGE UP
RBC # BLD: 3.2 M/UL — LOW (ref 3.8–5.2)
RBC # FLD: 17.9 % — HIGH (ref 10.3–14.5)
SARS-COV-2 RNA SPEC QL NAA+PROBE: DETECTED
SARS-COV-2 RNA SPEC QL NAA+PROBE: SIGNIFICANT CHANGE UP
SARS-COV-2 RNA SPEC QL NAA+PROBE: SIGNIFICANT CHANGE UP
SODIUM SERPL-SCNC: 137 MMOL/L — SIGNIFICANT CHANGE UP (ref 135–145)
WBC # BLD: 15.37 K/UL — HIGH (ref 3.8–10.5)
WBC # FLD AUTO: 15.37 K/UL — HIGH (ref 3.8–10.5)

## 2023-04-07 PROCEDURE — 99232 SBSQ HOSP IP/OBS MODERATE 35: CPT

## 2023-04-07 PROCEDURE — 99232 SBSQ HOSP IP/OBS MODERATE 35: CPT | Mod: GC

## 2023-04-07 RX ADMIN — Medication 3 MILLILITER(S): at 12:33

## 2023-04-07 RX ADMIN — CHLORHEXIDINE GLUCONATE 1 APPLICATION(S): 213 SOLUTION TOPICAL at 12:55

## 2023-04-07 RX ADMIN — Medication 3 MILLILITER(S): at 06:28

## 2023-04-07 RX ADMIN — Medication 3 MILLILITER(S): at 17:40

## 2023-04-07 NOTE — PROGRESS NOTE ADULT - ASSESSMENT
Impression:     #Acute on chronic anemia  Baseline hgb around 12 (2023), on admission 9 which is trending down to 7, no overt signs of GI bleeding. Fam hx of colon cancer in both father and mother dx at age 82 and 72,  of colon cancer. Last colonoscopy 8  yrs ago which was normal. Used to iron tablets, last taken 10 yrs ago.   - iron studies show low iron sat, high TIBC and high ferritin in the setting of pneumonia.   - Differentials broad including both UGIB and LGIB, PUD, angioectasia, less likely colitis, malignancy etc.   - no explanation for anemia found on EGD and colonoscopy     #CAP  on Abx    Recommendations:   - video capsule today     Risks of video capsule endoscopy explained, including risk of retained capsule, potentially requiring surgery for removal.  Patient understands and agrees to risks.    Capsule swallowed at: 11:20am     NPO now.  Resume Clear liquid diet at:  1:20pm   Resume regular consistency diet at: 3:20pm     Equipment can be removed by nursing staff at: 11:20pm     GI fellow will retrieve equipment in the morning.    NO MRI UNTIL CAPSULE CLEARANCE CONFIRMED. CAPSULE IS NOT MRI COMPATIBLE.     All recommendations are tentative until note is attested by attending.     Nelda Garcia, PGY-4   Gastroenterology/Hepatology Fellow  Available on [employer] Teams  [phone] ([facility] Short Range Pager)  [phone] ([facility] Long Range Pager)    After 5pm, please contact the on-call GI fellow. [phone]

## 2023-04-07 NOTE — PROGRESS NOTE ADULT - SUBJECTIVE AND OBJECTIVE BOX
[facility] Division of Hospital Medicine  Lilli Burns DO  Available on Teams Pattie    Patient is a 72y old  Female who presents with a chief complaint of Cough (07 Apr 2023 11:21)      SUBJECTIVE / OVERNIGHT EVENTS: Tested positive for COVID after roommate test positive yesterday. Today, feels very tired. No shortness of breath.  ADDITIONAL REVIEW OF SYSTEMS: negative    MEDICATIONS  (STANDING):  albuterol/ipratropium for Nebulization 3 milliLiter(s) Nebulizer every 6 hours  chlorhexidine 2% Cloths 1 Application(s) Topical daily  ferrous    sulfate 325 milliGRAM(s) Oral daily  lidocaine 4% Injection for Nebulization 4 milliLiter(s) Nebulizer once  pantoprazole    Tablet 40 milliGRAM(s) Oral before breakfast    MEDICATIONS  (PRN):  acetaminophen     Tablet .. 650 milliGRAM(s) Oral every 6 hours PRN Temp greater or equal to 38C (100.4F), Mild Pain (1 - 3)      CAPILLARY BLOOD GLUCOSE        I&O's Summary    06 Apr 2023 07:01  -  07 Apr 2023 07:00  --------------------------------------------------------  IN: 220 mL / OUT: 400 mL / NET: -180 mL    07 Apr 2023 07:01  -  07 Apr 2023 13:12  --------------------------------------------------------  IN: 0 mL / OUT: 0 mL / NET: 0 mL        PHYSICAL EXAM:  Vital Signs Last 24 Hrs  T(C): 37.2 (07 Apr 2023 09:21), Max: 37.8 (06 Apr 2023 23:41)  T(F): 98.9 (07 Apr 2023 09:21), Max: 100.1 (07 Apr 2023 00:50)  HR: 90 (07 Apr 2023 09:21) (90 - 116)  BP: 111/71 (07 Apr 2023 09:21) (107/71 - 118/74)  BP(mean): --  RR: 18 (07 Apr 2023 09:21) (18 - 18)  SpO2: 96% (07 Apr 2023 09:21) (96% - 98%)    Parameters below as of 07 Apr 2023 09:21  Patient On (Oxygen Delivery Method): room air      CONSTITUTIONAL: Well-groomed, in no apparent distress  EYES: No conjunctival or scleral injection, non-icteric; PERRLA and symmetric  ENMT: No external nasal lesions; no pharyngeal injection or exudates, oral mucosa with moist membranes  NECK: Trachea midline without palpable neck mass; thyroid not enlarged and non-tender  RESPIRATORY: Breathing comfortably; lungs CTA without wheeze/rhonchi/rales  CARDIOVASCULAR: +S1S2, RRR, no murmurs, pedal pulses full and symmetric; no lower extremity edema  GASTROINTESTINAL: No palpable masses or tenderness, +BS throughout, no rebound/guarding; no hepatosplenomegaly; no hernia palpated  MUSCULOSKELETAL: no digital clubbing or cyanosis; no paraspinal tenderness; normal strength and tone of extremities  SKIN: No rashes or ulcers noted; no subcutaneous nodules or induration palpable  NEUROLOGIC: CN II-XII intact; sensation intact in LEs b/l to light touch  PSYCHIATRIC: A+O x 3; mood and affect appropriate; appropriate insight and judgment    LABS:                        7.9    15.37 )-----------( 598      ( 07 Apr 2023 07:30 )             25.3     04-07    137  |  105  |  12  ----------------------------<  117<H>  3.6   |  21<L>  |  1.45<H>    Ca    8.1<L>      07 Apr 2023 07:29  Phos  2.8     04-07  Mg     1.8     04-07    TPro  5.8<L>  /  Alb  2.2<L>  /  TBili  0.6  /  DBili  x   /  AST  42<H>  /  ALT  30  /  AlkPhos  111  04-06    PT/INR - ( 06 Apr 2023 04:38 )   PT: 14.6 sec;   INR: 1.26 ratio         PTT - ( 06 Apr 2023 04:38 )  PTT:29.6 sec [facility] Division of Hospital Medicine  Lilli Burns DO  Available on Teams Pattie    Patient is a 72y old  Female who presents with a chief complaint of Cough (07 Apr 2023 11:21)      SUBJECTIVE / OVERNIGHT EVENTS: Tested positive for COVID after roommate test positive yesterday. Today, feels very tired. No shortness of breath. Walked to the bathroom with almost no assistance, nurse was there to make sure she did not fall or lose balance, however she was able to walk on her own from sitting in bed to get to the bathroom.  ADDITIONAL REVIEW OF SYSTEMS: negative    MEDICATIONS  (STANDING):  albuterol/ipratropium for Nebulization 3 milliLiter(s) Nebulizer every 6 hours  chlorhexidine 2% Cloths 1 Application(s) Topical daily  ferrous    sulfate 325 milliGRAM(s) Oral daily  lidocaine 4% Injection for Nebulization 4 milliLiter(s) Nebulizer once  pantoprazole    Tablet 40 milliGRAM(s) Oral before breakfast    MEDICATIONS  (PRN):  acetaminophen     Tablet .. 650 milliGRAM(s) Oral every 6 hours PRN Temp greater or equal to 38C (100.4F), Mild Pain (1 - 3)      CAPILLARY BLOOD GLUCOSE        I&O's Summary    06 Apr 2023 07:01  -  07 Apr 2023 07:00  --------------------------------------------------------  IN: 220 mL / OUT: 400 mL / NET: -180 mL    07 Apr 2023 07:01  -  07 Apr 2023 13:12  --------------------------------------------------------  IN: 0 mL / OUT: 0 mL / NET: 0 mL        PHYSICAL EXAM:  Vital Signs Last 24 Hrs  T(C): 37.2 (07 Apr 2023 09:21), Max: 37.8 (06 Apr 2023 23:41)  T(F): 98.9 (07 Apr 2023 09:21), Max: 100.1 (07 Apr 2023 00:50)  HR: 90 (07 Apr 2023 09:21) (90 - 116)  BP: 111/71 (07 Apr 2023 09:21) (107/71 - 118/74)  BP(mean): --  RR: 18 (07 Apr 2023 09:21) (18 - 18)  SpO2: 96% (07 Apr 2023 09:21) (96% - 98%)    Parameters below as of 07 Apr 2023 09:21  Patient On (Oxygen Delivery Method): room air      CONSTITUTIONAL: Well-groomed, in no apparent distress  EYES: No conjunctival or scleral injection, non-icteric; PERRLA and symmetric  ENMT: No external nasal lesions; no pharyngeal injection or exudates, oral mucosa with moist membranes  NECK: Trachea midline without palpable neck mass; thyroid not enlarged and non-tender  RESPIRATORY: Breathing comfortably; lungs CTA without wheeze/rhonchi/rales  CARDIOVASCULAR: +S1S2, RRR, no murmurs, pedal pulses full and symmetric; no lower extremity edema  GASTROINTESTINAL: No palpable masses or tenderness, +BS throughout, no rebound/guarding; no hepatosplenomegaly; no hernia palpated  MUSCULOSKELETAL: no digital clubbing or cyanosis; no paraspinal tenderness; normal strength and tone of extremities  SKIN: No rashes or ulcers noted; no subcutaneous nodules or induration palpable  NEUROLOGIC: CN II-XII intact; sensation intact in LEs b/l to light touch  PSYCHIATRIC: A+O x 3; mood and affect appropriate; appropriate insight and judgment    LABS:                        7.9    15.37 )-----------( 598      ( 07 Apr 2023 07:30 )             25.3     04-07    137  |  105  |  12  ----------------------------<  117<H>  3.6   |  21<L>  |  1.45<H>    Ca    8.1<L>      07 Apr 2023 07:29  Phos  2.8     04-07  Mg     1.8     04-07    TPro  5.8<L>  /  Alb  2.2<L>  /  TBili  0.6  /  DBili  x   /  AST  42<H>  /  ALT  30  /  AlkPhos  111  04-06    PT/INR - ( 06 Apr 2023 04:38 )   PT: 14.6 sec;   INR: 1.26 ratio         PTT - ( 06 Apr 2023 04:38 )  PTT:29.6 sec

## 2023-04-07 NOTE — PROGRESS NOTE ADULT - NSPROGADDITIONALINFOA_GEN_ALL_CORE
Plan discussed with ACP Karlie Burns, DO  Available on Teams sara    Called brother Henry yesterday however was unable to reach. WIll call today.    P4D after results of capsule endoscopy come back, tomorrow. Not sure if DC plan is for home or [facility]. Plan discussed with ACP Karlie Burns, DO  Available on Teams sara    Spoke with brother Henry today for updates.    P4D after results of capsule endoscopy come back, tomorrow. Not sure if DC plan is for home or [facility] - I think home with home PT is appropriate.

## 2023-04-07 NOTE — PROGRESS NOTE ADULT - PROBLEM SELECTOR PLAN 1
2/2 PNA, repeat lactate cleared, blood and urine cultures ngtd  Continue abx for resistant organism given abx use within last 90 days  s/p 7 days zosyn

## 2023-04-07 NOTE — PROGRESS NOTE ADULT - PROBLEM SELECTOR PLAN 8
Lovenox on hold for dropping hgb can resume if stable  Keep Mg >2 K> 4  Regular diet    PT consult recs HPT w/RW

## 2023-04-07 NOTE — PROGRESS NOTE ADULT - ATTENDING COMMENTS
Agree with above. No findings on EGD/colonoscopy to explain anemia - video capsule endoscopy for small bowel evaluation in progress.
The patient has recovered from the gram negative bacteremia associated with the pneumonia.  She has completed red blood cell transfusion and a series of IV iron therapy. Management discussed regarding her care and follow up at [facility], [place]
Agree with above. Pt on room air, saturating well, no complaints. She will finish antibiotics tomorrow for pneumonia. Will plan for EGD/colonoscopy tomorrow for workup of new anemia. Transfuse for Hgb >8.
MINERVA- ATN in the setting of hypotension/ contrast use  baseline creat 0.7> peaked to 2.0> now 1.7. urinating   catheterized specimen with multiple RBCs in urine + protein- need to repeat    randi olmedo  nephrology attending   please contact me on TEAMS   Office- [phone]

## 2023-04-07 NOTE — PATIENT PROFILE ADULT - FALL HARM RISK - HARM RISK INTERVENTIONS

## 2023-04-07 NOTE — PROGRESS NOTE ADULT - PROBLEM SELECTOR PLAN 3
-first tested positive 4/6  -monitor O2, not hypoxic currently, no need for remdesivir/decadron  -symptomatic treatment with tylenol, and guaifenesin if develops cough

## 2023-04-07 NOTE — PROGRESS NOTE ADULT - SUBJECTIVE AND OBJECTIVE BOX
Gastroenterology/Hepatology Progress Note    Interval Events:   - no acute ON events     Allergies:  No Known Allergies      Hospital Medications:  acetaminophen     Tablet .. 650 milliGRAM(s) Oral every 6 hours PRN  albuterol/ipratropium for Nebulization 3 milliLiter(s) Nebulizer every 6 hours  chlorhexidine 2% Cloths 1 Application(s) Topical daily  ferrous    sulfate 325 milliGRAM(s) Oral daily  lidocaine 4% Injection for Nebulization 4 milliLiter(s) Nebulizer once  pantoprazole    Tablet 40 milliGRAM(s) Oral before breakfast      ROS: 14 point ROS negative unless otherwise state in subjective    PHYSICAL EXAM:   Vital Signs:  Vital Signs Last 24 Hrs  T(C): 37.2 (07 Apr 2023 09:21), Max: 37.8 (06 Apr 2023 23:41)  T(F): 98.9 (07 Apr 2023 09:21), Max: 100.1 (07 Apr 2023 00:50)  HR: 90 (07 Apr 2023 09:21) (90 - 116)  BP: 111/71 (07 Apr 2023 09:21) (107/71 - 128/82)  BP(mean): --  RR: 18 (07 Apr 2023 09:21) (18 - 18)  SpO2: 96% (07 Apr 2023 09:21) (96% - 98%)    Parameters below as of 07 Apr 2023 09:21  Patient On (Oxygen Delivery Method): room air      Daily     Daily     GENERAL:  No acute distress  HEENT:  NCAT, no scleral icterus  CHEST: no resp distress  HEART:  RRR  ABDOMEN:  Soft, non-tender, non-distended, no masses  EXTREMITIES:  No cyanosis, clubbing, or edema  SKIN:  No rash/erythema/ecchymoses/petechiae/wounds/abscess/warm/dry  NEURO:  Alert and oriented x 3, no asterixis, no tremor  PSYCH: affect anxious     LABS:                        7.9    15.37 )-----------( 598      ( 07 Apr 2023 07:30 )             25.3     Mean Cell Volume: 79.1 fl (04-07-23 @ 07:30)    04-07    137  |  105  |  12  ----------------------------<  117<H>  3.6   |  21<L>  |  1.45<H>    Ca    8.1<L>      07 Apr 2023 07:29  Phos  2.8     04-07  Mg     1.8     04-07    TPro  5.8<L>  /  Alb  2.2<L>  /  TBili  0.6  /  DBili  x   /  AST  42<H>  /  ALT  30  /  AlkPhos  111  04-06    LIVER FUNCTIONS - ( 06 Apr 2023 04:38 )  Alb: 2.2 g/dL / Pro: 5.8 g/dL / ALK PHOS: 111 U/L / ALT: 30 U/L / AST: 42 U/L / GGT: x           PT/INR - ( 06 Apr 2023 04:38 )   PT: 14.6 sec;   INR: 1.26 ratio         PTT - ( 06 Apr 2023 04:38 )  PTT:29.6 sec          Imaging:

## 2023-04-07 NOTE — PROGRESS NOTE ADULT - PROBLEM SELECTOR PLAN 6
Microcytic  No hx of bleeding, but previous hgb 12.7 last month - check occult blood   iron studies, reticulocyte count suggest BREA with some elevated ferritin may be reactive (still denies bleeding)  Normal B12 and folic acid (doesn't eat meat but supplements)    LDH/Hapto both high unlikely hemolyzing and bili normal  Keep active t/s, transfuse < 7 (1U given 4/5)  IV iron x5 days  Heme f/u  ?MM w/u- renal and heme following will check spep/upep  -GI following, s/p EGD/colonoscopy both no e/o bleeding, except gastritis, plan for capsule endoscopy 4/7

## 2023-04-08 ENCOUNTER — TRANSCRIPTION ENCOUNTER (OUTPATIENT)
Age: 73
End: 2023-04-08

## 2023-04-08 LAB
ANION GAP SERPL CALC-SCNC: 10 MMOL/L — SIGNIFICANT CHANGE UP (ref 5–17)
APPEARANCE UR: ABNORMAL
BILIRUB UR-MCNC: NEGATIVE — SIGNIFICANT CHANGE UP
BUN SERPL-MCNC: 11 MG/DL — SIGNIFICANT CHANGE UP (ref 7–23)
CALCIUM SERPL-MCNC: 8.2 MG/DL — LOW (ref 8.4–10.5)
CHLORIDE SERPL-SCNC: 106 MMOL/L — SIGNIFICANT CHANGE UP (ref 96–108)
CO2 SERPL-SCNC: 21 MMOL/L — LOW (ref 22–31)
COLOR SPEC: YELLOW — SIGNIFICANT CHANGE UP
CREAT SERPL-MCNC: 1.56 MG/DL — HIGH (ref 0.5–1.3)
CULTURE RESULTS: SIGNIFICANT CHANGE UP
CULTURE RESULTS: SIGNIFICANT CHANGE UP
DIFF PNL FLD: ABNORMAL
EGFR: 35 ML/MIN/1.73M2 — LOW
GLUCOSE SERPL-MCNC: 119 MG/DL — HIGH (ref 70–99)
GLUCOSE UR QL: ABNORMAL
HCT VFR BLD CALC: 28.7 % — LOW (ref 34.5–45)
HGB BLD-MCNC: 8.6 G/DL — LOW (ref 11.5–15.5)
KETONES UR-MCNC: NEGATIVE — SIGNIFICANT CHANGE UP
LEUKOCYTE ESTERASE UR-ACNC: NEGATIVE — SIGNIFICANT CHANGE UP
MAGNESIUM SERPL-MCNC: 1.8 MG/DL — SIGNIFICANT CHANGE UP (ref 1.6–2.6)
MCHC RBC-ENTMCNC: 25.1 PG — LOW (ref 27–34)
MCHC RBC-ENTMCNC: 30 GM/DL — LOW (ref 32–36)
MCV RBC AUTO: 83.9 FL — SIGNIFICANT CHANGE UP (ref 80–100)
NITRITE UR-MCNC: NEGATIVE — SIGNIFICANT CHANGE UP
NRBC # BLD: 0 /100 WBCS — SIGNIFICANT CHANGE UP (ref 0–0)
PH UR: 6.5 — SIGNIFICANT CHANGE UP (ref 5–8)
PHOSPHATE SERPL-MCNC: 2.7 MG/DL — SIGNIFICANT CHANGE UP (ref 2.5–4.5)
PLATELET # BLD AUTO: 575 K/UL — HIGH (ref 150–400)
POTASSIUM SERPL-MCNC: 3.7 MMOL/L — SIGNIFICANT CHANGE UP (ref 3.5–5.3)
POTASSIUM SERPL-SCNC: 3.7 MMOL/L — SIGNIFICANT CHANGE UP (ref 3.5–5.3)
PROT UR-MCNC: ABNORMAL
RBC # BLD: 3.42 M/UL — LOW (ref 3.8–5.2)
RBC # FLD: 18.8 % — HIGH (ref 10.3–14.5)
SODIUM SERPL-SCNC: 137 MMOL/L — SIGNIFICANT CHANGE UP (ref 135–145)
SP GR SPEC: 1.01 — SIGNIFICANT CHANGE UP (ref 1.01–1.02)
SPECIMEN SOURCE: SIGNIFICANT CHANGE UP
SPECIMEN SOURCE: SIGNIFICANT CHANGE UP
UROBILINOGEN FLD QL: NEGATIVE — SIGNIFICANT CHANGE UP
WBC # BLD: 14.91 K/UL — HIGH (ref 3.8–10.5)
WBC # FLD AUTO: 14.91 K/UL — HIGH (ref 3.8–10.5)

## 2023-04-08 PROCEDURE — 71045 X-RAY EXAM CHEST 1 VIEW: CPT | Mod: 26

## 2023-04-08 PROCEDURE — 99232 SBSQ HOSP IP/OBS MODERATE 35: CPT

## 2023-04-08 RX ORDER — ZINC ACETATE DIHYDRATE 100 %
1 CRYSTALS MISCELLANEOUS
Refills: 0 | DISCHARGE

## 2023-04-08 RX ORDER — POTASSIUM CHLORIDE 20 MEQ
20 PACKET (EA) ORAL ONCE
Refills: 0 | Status: COMPLETED | OUTPATIENT
Start: 2023-04-08 | End: 2023-04-08

## 2023-04-08 RX ORDER — ECHINACEA PURPUREA EXTRACT 125 MG
0 TABLET ORAL
Refills: 0 | DISCHARGE

## 2023-04-08 RX ORDER — FERROUS SULFATE 325(65) MG
1 TABLET ORAL
Qty: 0 | Refills: 0 | DISCHARGE
Start: 2023-04-08

## 2023-04-08 RX ORDER — ACETAMINOPHEN 500 MG
2 TABLET ORAL
Qty: 0 | Refills: 0 | DISCHARGE
Start: 2023-04-08

## 2023-04-08 RX ORDER — PANTOPRAZOLE SODIUM 20 MG/1
1 TABLET, DELAYED RELEASE ORAL
Qty: 0 | Refills: 0 | DISCHARGE
Start: 2023-04-08

## 2023-04-08 RX ADMIN — Medication 3 MILLILITER(S): at 17:52

## 2023-04-08 RX ADMIN — CHLORHEXIDINE GLUCONATE 1 APPLICATION(S): 213 SOLUTION TOPICAL at 18:30

## 2023-04-08 RX ADMIN — Medication 20 MILLIEQUIVALENT(S): at 17:53

## 2023-04-08 RX ADMIN — Medication 3 MILLILITER(S): at 05:49

## 2023-04-08 RX ADMIN — Medication 650 MILLIGRAM(S): at 20:54

## 2023-04-08 RX ADMIN — PANTOPRAZOLE SODIUM 40 MILLIGRAM(S): 20 TABLET, DELAYED RELEASE ORAL at 05:49

## 2023-04-08 RX ADMIN — Medication 650 MILLIGRAM(S): at 21:30

## 2023-04-08 RX ADMIN — Medication 3 MILLILITER(S): at 14:29

## 2023-04-08 RX ADMIN — Medication 325 MILLIGRAM(S): at 14:33

## 2023-04-08 RX ADMIN — Medication 3 MILLILITER(S): at 23:21

## 2023-04-08 NOTE — DISCHARGE NOTE PROVIDER - NSDCMRMEDTOKEN_GEN_ALL_CORE_FT
acetaminophen 325 mg oral tablet: 2 tab(s) orally every 6 hours As needed Temp greater or equal to 38C (100.4F), Mild Pain (1 - 3)  ferrous sulfate 325 mg (65 mg elemental iron) oral tablet: 1 tab(s) orally once a day  pantoprazole 40 mg oral delayed release tablet: 1 tab(s) orally once a day (before a meal)  Vitamin D3 25 mcg (1000 intl units) oral tablet: 1 tab(s) orally once a day

## 2023-04-08 NOTE — DISCHARGE NOTE PROVIDER - ATTENDING DISCHARGE PHYSICAL EXAMINATION:
Patient seen and examined. Feeling well. No new complaints at this time.  Vital Signs Last 24 Hrs  T(C): 37.2 (13 Apr 2023 09:57), Max: 37.7 (12 Apr 2023 21:41)  T(F): 98.9 (13 Apr 2023 09:57), Max: 99.9 (12 Apr 2023 21:41)  HR: 90 (13 Apr 2023 09:57) (88 - 90)  BP: 115/65 (13 Apr 2023 09:57) (93/61 - 115/65)  RR: 18 (13 Apr 2023 09:57) (18 - 18)  SpO2: 95% (13 Apr 2023 09:57) (95% - 95%)    Parameters below as of 13 Apr 2023 09:57  Patient On (Oxygen Delivery Method): room air    CONSTITUTIONAL: NAD, well-developed   EYES: PERRLA; conjunctiva and sclera clear  ENMT: Moist oral mucosa, no pharyngeal injection or exudates   NECK: Supple   RESPIRATORY: Normal respiratory effort; lungs are clear to auscultation bilaterally  CARDIOVASCULAR: Regular rate and rhythm, normal S1 and S2, no murmur   ABDOMEN: Nontender to palpation, normoactive bowel sounds   MUSCULOSKELETAL: no clubbing or cyanosis of digits; no joint swelling or tenderness to palpation  PSYCH: A+O to person, place, and time; affect appropriate  NEUROLOGY: no gross sensory deficits   SKIN: No rashes

## 2023-04-08 NOTE — PROGRESS NOTE ADULT - NSPROGADDITIONALINFOA_GEN_ALL_CORE
Plan discussed with ACP Ada Burns, DO  Available on Teams sara    Spoke with brother Henry yesterday for updates.    P4D after results of capsule endoscopy come back. Patient reportedly does not want to go home to get PT, will have PT re-evaluate her. Maybe she can go to [facility].

## 2023-04-08 NOTE — DISCHARGE NOTE PROVIDER - HOSPITAL COURSE
72F pmhx of recent pneumonia presents with severe sepsis 2/2 pneumonia.   Severe sepsis.   ·  Plan: 2/2 PNA, repeat lactate cleared, blood and urine cultures ngtd  Continue abx for resistant organism given abx use within last 90 days  s/p 7 days zosyn.    Gram-negative pneumonia.   · completed antibiotics.    2019 novel coronavirus disease (COVID-19).    -first tested positive 4/6  -monitor O2, not hypoxic currently, no need for remdesivir/decadron  -symptomatic treatment with tylenol, and guaifenesin if develops cough.    Thrombocytosis.   No hx of this  Likely reactive.    Elevated troponin.   - Downtrended but delta change is 15  Pt also complaining of SOB and weakness - however TTE WNL  EKG sinus tachycardia no ischemic changes.    Anemia.   · Microcytic  No hx of bleeding, but previous hgb 12.7 last month - check occult blood   iron studies, reticulocyte count suggest BREA with some elevated ferritin may be reactive (still denies bleeding)  Normal B12 and folic acid (doesn't eat meat but supplements)    LDH/Hapto both high unlikely hemolyzing and bili normal  Keep active t/s, transfuse < 7 (1U given 4/5)  IV iron x5 days  Heme f/u  ?MM w/u- renal and heme following will check spep/upep  -GI following, s/p EGD/colonoscopy both no e/o bleeding, except gastritis, plan for capsule endoscopy 4/7.    MINERVA (acute kidney injury).   · FeNA 1% intrinsic, due to ATN, now resolved  US showing echogenic kidneys no hydro  Renal consult appreciated   BMP QD  s/p IVF.    Need for prophylactic measure.   · Lovenox on hold for dropping hgb can resume if stable  Keep Mg >2 K> 4  Regular diet    PT consult recs HPT w/RW.      On 4/8/23 case was discussed with Dr. Burns patient is medically cleared and optimized for discharge today. All medications were reviewed with attending, and sent to mutually agreed upon pharmacy     72F pmhx of recent pneumonia presents with severe sepsis 2/2 pneumonia.   Severe sepsis.   ·  Plan: 2/2 PNA, repeat lactate cleared, blood and urine cultures ngtd  Continue abx for resistant organism given abx use within last 90 days  s/p 7 days zosyn.    Gram-negative pneumonia.   · completed antibiotics.    2019 novel coronavirus disease (COVID-19).    -first tested positive 4/6  -monitor O2, not hypoxic currently, no need for remdesivir/decadron  -symptomatic treatment with tylenol, and guaifenesin if develops cough.    Thrombocytosis.   No hx of this  Likely reactive.    Elevated troponin.   - Downtrended but delta change is 15  Pt also complaining of SOB and weakness - however TTE WNL  EKG sinus tachycardia no ischemic changes.    Anemia.   · Microcytic  No hx of bleeding, but previous hgb 12.7 last month - check occult blood   iron studies, reticulocyte count suggest BREA with some elevated ferritin may be reactive (still denies bleeding)  Normal B12 and folic acid (doesn't eat meat but supplements)    LDH/Hapto both high unlikely hemolyzing and bili normal  Keep active t/s, transfuse < 7 (1U given 4/5)  IV iron x5 days  Heme f/u  ?MM w/u- renal and heme following will check spep/upep  -GI following, s/p EGD/colonoscopy both no e/o bleeding, except gastritis, plan for capsule endoscopy 4/7.    MINERVA (acute kidney injury).   · FeNA 1% intrinsic, due to ATN, now resolved  US showing echogenic kidneys no hydro  Renal consult appreciated   BMP QD  s/p IVF.    Need for prophylactic measure.   · Lovenox on hold for dropping hgb can resume if stable  Keep Mg >2 K> 4  Regular diet    PT consult recs HPT w/RW. pt refused to go home. PT re-evaluated pt, now rec to [facility].       On 4/8/23 case was discussed with Dr. Burns patient is medically cleared and optimized for discharge today. All medications were reviewed with attending, and sent to mutually agreed upon pharmacy     72F pmhx of recent pneumonia presents with severe sepsis 2/2 pneumonia.   Severe sepsis.   ·  Plan: 2/2 PNA, repeat lactate cleared, blood and urine cultures ngtd  Continue abx for resistant organism given abx use within last 90 days  s/p 7 days zosyn.    Gram-negative pneumonia.   · completed antibiotics.    2019 novel coronavirus disease (COVID-19).    -first tested positive 4/6  -monitor O2, not hypoxic currently, no need for remdesivir/decadron  -symptomatic treatment with tylenol, and guaifenesin if develops cough.    Thrombocytosis.   No hx of this  Likely reactive.    Elevated troponin.   - Downtrended but delta change is 15  Pt also complaining of SOB and weakness - however TTE WNL  EKG sinus tachycardia no ischemic changes.    Anemia.   · Microcytic  No hx of bleeding, but previous hgb 12.7 last month - check occult blood   iron studies, reticulocyte count suggest BREA with some elevated ferritin may be reactive (still denies bleeding)  Normal B12 and folic acid (doesn't eat meat but supplements)    LDH/Hapto both high unlikely hemolyzing and bili normal  Keep active t/s, transfuse < 7 (1U given 4/5)  IV iron x5 days  Heme f/u  ?MM w/u- renal and heme following will check spep/upep  -GI following, s/p EGD/colonoscopy both no e/o bleeding, except gastritis, plan for capsule endoscopy 4/7.    MINERVA (acute kidney injury).   · FeNA 1% intrinsic, due to ATN, now resolved  US showing echogenic kidneys no hydro  Renal consult appreciated   BMP QD  s/p IVF.    Need for prophylactic measure.   · Lovenox on hold for dropping hgb can resume if stable  Keep Mg >2 K> 4  Regular diet    PT consult recs HPT w/RW. pt refused to go home. PT re-evaluated pt, now rec to [facility].       Acute issues resolved.  D/C to [facility].

## 2023-04-08 NOTE — PROGRESS NOTE ADULT - PROBLEM SELECTOR PLAN 1
2/2 PNA, repeat lactate cleared, blood and urine cultures ngtd  Continue abx for resistant organism given abx use within last 90 days  s/p 7 days zosyn  -resolved

## 2023-04-08 NOTE — DISCHARGE NOTE PROVIDER - NSDCCPCAREPLAN_GEN_ALL_CORE_FT
PRINCIPAL DISCHARGE DIAGNOSIS  Diagnosis: RLL pneumonia  Assessment and Plan of Treatment: you were diagnosed with pneumonia and treated with IV abx.  Follow up with your primary care physician for further monitoring in 1-2 weeks. Please call to arrange appointment.        SECONDARY DISCHARGE DIAGNOSES  Diagnosis: Anemia  Assessment and Plan of Treatment: You were evaluated by Dr. Baldwin and his gastroenterology team and underwent a capsule study for evaluation of your anemia.  Follow up as directed.        Diagnosis: MINERVA (acute kidney injury)  Assessment and Plan of Treatment:      PRINCIPAL DISCHARGE DIAGNOSIS  Diagnosis: RLL pneumonia  Assessment and Plan of Treatment: you were diagnosed with pneumonia and treated with IV abx.  Follow up with your primary care physician for further monitoring in 1-2 weeks. Please call to arrange appointment.        SECONDARY DISCHARGE DIAGNOSES  Diagnosis: MINERVA (acute kidney injury)  Assessment and Plan of Treatment: Avoid taking (NSAIDs) - (ex: Ibuprofen, Advil, Celebrex, Naprosyn)  Avoid taking any nephrotoxic agents (can harm kidneys) - Intravenous contrast for diagnostic testing, combination cold medications.  Have all medications adjusted for your renal function by your Health Care Provider.  Blood pressure control is important.  Take all medication as prescribed.      Diagnosis: Anemia  Assessment and Plan of Treatment: You were evaluated by Dr. Baldwin and his gastroenterology team and underwent a capsule study for evaluation of your anemia.  Follow up as directed.  iron deficiency. s/p iv iron. now reosolved.

## 2023-04-08 NOTE — PROGRESS NOTE ADULT - PROBLEM SELECTOR PLAN 6
Microcytic  No hx of bleeding, but previous hgb 12.7 last month - check occult blood   iron studies, reticulocyte count suggest BREA with some elevated ferritin may be reactive (still denies bleeding)  Normal B12 and folic acid (doesn't eat meat but supplements)    LDH/Hapto both high unlikely hemolyzing and bili normal  Keep active t/s, transfuse < 7 (1U given 4/5)  IV iron x5 days  Heme f/u, will refer to [facility] heme upon discharge  ?MM w/u- renal and heme following will check spep/upep  -GI following, s/p EGD/colonoscopy both no e/o bleeding, except gastritis, f/u capsule endoscopy results on 4/8

## 2023-04-08 NOTE — PROGRESS NOTE ADULT - SUBJECTIVE AND OBJECTIVE BOX
[facility] Division of Hospital Medicine  Lilli Burns DO  Available on Teams Pattie    Patient is a 72y old  Female who presents with a chief complaint of Cough (08 Apr 2023 12:11)      SUBJECTIVE / OVERNIGHT EVENTS: none. Patient had capsule endoscopy yesterday, awaiting results today. Had a BM yesterday. Today, says she feels tired. No SOB.  ADDITIONAL REVIEW OF SYSTEMS: negative    MEDICATIONS  (STANDING):  albuterol/ipratropium for Nebulization 3 milliLiter(s) Nebulizer every 6 hours  chlorhexidine 2% Cloths 1 Application(s) Topical daily  ferrous    sulfate 325 milliGRAM(s) Oral daily  lidocaine 4% Injection for Nebulization 4 milliLiter(s) Nebulizer once  pantoprazole    Tablet 40 milliGRAM(s) Oral before breakfast  potassium chloride    Tablet ER 20 milliEquivalent(s) Oral once    MEDICATIONS  (PRN):  acetaminophen     Tablet .. 650 milliGRAM(s) Oral every 6 hours PRN Temp greater or equal to 38C (100.4F), Mild Pain (1 - 3)      CAPILLARY BLOOD GLUCOSE        I&O's Summary    07 Apr 2023 07:01  -  08 Apr 2023 07:00  --------------------------------------------------------  IN: 480 mL / OUT: 1150 mL / NET: -670 mL        PHYSICAL EXAM:  Vital Signs Last 24 Hrs  T(C): 37.6 (08 Apr 2023 04:34), Max: 37.8 (08 Apr 2023 01:25)  T(F): 99.7 (08 Apr 2023 04:34), Max: 100.1 (08 Apr 2023 01:25)  HR: 98 (08 Apr 2023 04:34) (92 - 98)  BP: 113/76 (08 Apr 2023 04:34) (113/76 - 130/83)  BP(mean): --  RR: 18 (08 Apr 2023 04:34) (18 - 18)  SpO2: 95% (08 Apr 2023 04:34) (95% - 98%)    Parameters below as of 08 Apr 2023 04:34  Patient On (Oxygen Delivery Method): room air      CONSTITUTIONAL: Well-groomed, in no apparent distress  EYES: No conjunctival or scleral injection, non-icteric; PERRLA and symmetric  ENMT: No external nasal lesions; no pharyngeal injection or exudates, oral mucosa with moist membranes  NECK: Trachea midline without palpable neck mass; thyroid not enlarged and non-tender  RESPIRATORY: Breathing comfortably; lungs CTA without wheeze/rhonchi/rales  CARDIOVASCULAR: +S1S2, RRR, no murmurs, pedal pulses full and symmetric; no lower extremity edema  GASTROINTESTINAL: No palpable masses or tenderness, +BS throughout, no rebound/guarding; no hepatosplenomegaly; no hernia palpated  MUSCULOSKELETAL: no digital clubbing or cyanosis; no paraspinal tenderness; normal strength and tone of extremities  SKIN: No rashes or ulcers noted; no subcutaneous nodules or induration palpable  NEUROLOGIC: CN II-XII intact; sensation intact in LEs b/l to light touch  PSYCHIATRIC: A+O x 3; mood and affect appropriate; appropriate insight and judgment    LABS:                        8.6    14.91 )-----------( 575      ( 08 Apr 2023 06:55 )             28.7     04-08    137  |  106  |  11  ----------------------------<  119<H>  3.7   |  21<L>  |  1.56<H>    Ca    8.2<L>      08 Apr 2023 06:56  Phos  2.7     04-08  Mg     1.8     04-08

## 2023-04-08 NOTE — DISCHARGE NOTE PROVIDER - CARE PROVIDER_API CALL
LINDA, [place]  Internal [facility]  [address] [place] [address]  Phone: [phone]  Fax: ()-  Follow Up Time:

## 2023-04-08 NOTE — PROGRESS NOTE ADULT - PROBLEM SELECTOR PLAN 3
-first tested positive 4/6, but then negative x2 within 24h  -patient feels fatigue but no other cold symptoms  -monitor O2, not hypoxic currently, no need for remdesivir/decadron  -symptomatic treatment with tylenol, and guaifenesin if develops cough

## 2023-04-09 LAB
ANION GAP SERPL CALC-SCNC: 11 MMOL/L — SIGNIFICANT CHANGE UP (ref 5–17)
APPEARANCE UR: ABNORMAL
BACTERIA # UR AUTO: NEGATIVE — SIGNIFICANT CHANGE UP
BILIRUB UR-MCNC: NEGATIVE — SIGNIFICANT CHANGE UP
BUN SERPL-MCNC: 14 MG/DL — SIGNIFICANT CHANGE UP (ref 7–23)
CALCIUM SERPL-MCNC: 8.1 MG/DL — LOW (ref 8.4–10.5)
CHLORIDE SERPL-SCNC: 104 MMOL/L — SIGNIFICANT CHANGE UP (ref 96–108)
CO2 SERPL-SCNC: 23 MMOL/L — SIGNIFICANT CHANGE UP (ref 22–31)
COLOR SPEC: YELLOW — SIGNIFICANT CHANGE UP
CREAT ?TM UR-MCNC: 108 MG/DL — SIGNIFICANT CHANGE UP
CREAT SERPL-MCNC: 1.61 MG/DL — HIGH (ref 0.5–1.3)
DIFF PNL FLD: ABNORMAL
EGFR: 33 ML/MIN/1.73M2 — LOW
EPI CELLS # UR: 14 /HPF — HIGH
GLUCOSE SERPL-MCNC: 129 MG/DL — HIGH (ref 70–99)
GLUCOSE UR QL: ABNORMAL
HCT VFR BLD CALC: 27.9 % — LOW (ref 34.5–45)
HGB BLD-MCNC: 8.5 G/DL — LOW (ref 11.5–15.5)
HYALINE CASTS # UR AUTO: 5 /LPF — SIGNIFICANT CHANGE UP (ref 0–7)
KETONES UR-MCNC: NEGATIVE — SIGNIFICANT CHANGE UP
LEUKOCYTE ESTERASE UR-ACNC: NEGATIVE — SIGNIFICANT CHANGE UP
MAGNESIUM SERPL-MCNC: 1.8 MG/DL — SIGNIFICANT CHANGE UP (ref 1.6–2.6)
MCHC RBC-ENTMCNC: 25.1 PG — LOW (ref 27–34)
MCHC RBC-ENTMCNC: 30.5 GM/DL — LOW (ref 32–36)
MCV RBC AUTO: 82.3 FL — SIGNIFICANT CHANGE UP (ref 80–100)
NITRITE UR-MCNC: NEGATIVE — SIGNIFICANT CHANGE UP
NRBC # BLD: 0 /100 WBCS — SIGNIFICANT CHANGE UP (ref 0–0)
PH UR: 6 — SIGNIFICANT CHANGE UP (ref 5–8)
PHOSPHATE SERPL-MCNC: 3.1 MG/DL — SIGNIFICANT CHANGE UP (ref 2.5–4.5)
PLATELET # BLD AUTO: 552 K/UL — HIGH (ref 150–400)
POTASSIUM SERPL-MCNC: 3.4 MMOL/L — LOW (ref 3.5–5.3)
POTASSIUM SERPL-SCNC: 3.4 MMOL/L — LOW (ref 3.5–5.3)
PROT UR-MCNC: ABNORMAL
RBC # BLD: 3.39 M/UL — LOW (ref 3.8–5.2)
RBC # FLD: 19 % — HIGH (ref 10.3–14.5)
RBC CASTS # UR COMP ASSIST: 11 /HPF — HIGH (ref 0–4)
SARS-COV-2 RNA SPEC QL NAA+PROBE: SIGNIFICANT CHANGE UP
SODIUM SERPL-SCNC: 138 MMOL/L — SIGNIFICANT CHANGE UP (ref 135–145)
SODIUM UR-SCNC: 82 MMOL/L — SIGNIFICANT CHANGE UP
SP GR SPEC: 1.02 — SIGNIFICANT CHANGE UP (ref 1.01–1.02)
UROBILINOGEN FLD QL: NEGATIVE — SIGNIFICANT CHANGE UP
WBC # BLD: 14.7 K/UL — HIGH (ref 3.8–10.5)
WBC # FLD AUTO: 14.7 K/UL — HIGH (ref 3.8–10.5)
WBC UR QL: 16 /HPF — HIGH (ref 0–5)

## 2023-04-09 PROCEDURE — 99233 SBSQ HOSP IP/OBS HIGH 50: CPT

## 2023-04-09 PROCEDURE — 91110 GI TRC IMG INTRAL ESOPH-ILE: CPT | Mod: 26,GC

## 2023-04-09 RX ORDER — POTASSIUM CHLORIDE 20 MEQ
40 PACKET (EA) ORAL ONCE
Refills: 0 | Status: COMPLETED | OUTPATIENT
Start: 2023-04-09 | End: 2023-04-09

## 2023-04-09 RX ADMIN — Medication 3 MILLILITER(S): at 05:44

## 2023-04-09 RX ADMIN — PANTOPRAZOLE SODIUM 40 MILLIGRAM(S): 20 TABLET, DELAYED RELEASE ORAL at 05:44

## 2023-04-09 RX ADMIN — Medication 325 MILLIGRAM(S): at 18:14

## 2023-04-09 RX ADMIN — Medication 40 MILLIEQUIVALENT(S): at 18:20

## 2023-04-09 NOTE — PROGRESS NOTE ADULT - PROBLEM SELECTOR PLAN 6
Microcytic  No hx of bleeding, but previous hgb 12.7 last month - check occult blood   iron studies, reticulocyte count suggest BREA with some elevated ferritin may be reactive (still denies bleeding)  Normal B12 and folic acid (doesn't eat meat but supplements)    LDH/Hapto both high unlikely hemolyzing and bili normal  Keep active t/s, transfuse < 7 (1U given 4/5)  IV iron x5 days  Heme f/u, will refer to [facility] heme upon discharge  ?MM w/u- renal and heme following will check spep/upep  -GI following, s/p EGD/colonoscopy both no e/o bleeding, except gastritis, f/u capsule endoscopy results (started 4/7)

## 2023-04-09 NOTE — PROGRESS NOTE ADULT - PROBLEM SELECTOR PLAN 1
No 2/2 PNA, repeat lactate cleared, blood and urine cultures ngtd  Continue abx for resistant organism given abx use within last 90 days  s/p 7 days zosyn  -resolved    New fever 4/8 of 101F - f/u repeat blood cultures, no other symptoms per patient. Could be due to covid.

## 2023-04-09 NOTE — PROGRESS NOTE ADULT - PROBLEM SELECTOR PLAN 3
-first tested positive 4/6, but then negative x2 within 24h, recheck one more to ensure if truly neg/pos  -patient feels fatigue but no other cold symptoms  -monitor O2, not hypoxic currently, no need for remdesivir/decadron  -symptomatic treatment with tylenol, and guaifenesin if develops cough

## 2023-04-09 NOTE — CHART NOTE - NSCHARTNOTEFT_GEN_A_CORE
Brief Update Note    Remains HDS  Febrile and now covid positive  Hgb stable, no reported bleeding  No plan for non-urgent endoscopic evaluation at this time, given no active bleeding on VCE (see full report) and the aforementioned     Reasonable to consider when off covid precaution, if has worsening anemia   Otherwise continue to monitor Hgb    Rest of care per primary  See prior note for full recs  Please reach out if any further questions or concerns.    Preliminary note until signed by Attending.    Thank you for involving us in this patient's care.    Betty Dumas MD  Gastroenterology/Hepatology Fellow, PGY-VI      NON-URGENT CONSULTS:  Please email [email or web address]  [email or web address]  AT NIGHT AND ON WEEKENDS:  Contact on-call GI fellow via answering service ([phone]) from 5pm-8am and on weekends/holidays  MONDAY-FRIDAY 8AM-5PM:  Pager# [phone] ([facility])

## 2023-04-09 NOTE — PROGRESS NOTE ADULT - PROBLEM SELECTOR PLAN 8
Lovenox on hold for dropping hgb can resume if stable  Keep Mg >2 K> 4  Regular diet    PT consult recs [facility] Thalidomide Counseling: I discussed with the patient the risks of thalidomide including but not limited to birth defects, anxiety, weakness, chest pain, dizziness, cough and severe allergy.

## 2023-04-09 NOTE — PROGRESS NOTE ADULT - PROBLEM SELECTOR PLAN 7
FeNA 1% intrinsic, due to ATN, initially resolved, no Cr uptrending again  US showing echogenic kidneys no hydro  Renal consult appreciated   BMP QD  s/p IVF  check UA, U Na and U Cr  could be related to covid

## 2023-04-09 NOTE — PROGRESS NOTE ADULT - NSPROGADDITIONALINFOA_GEN_ALL_CORE
Plan discussed with ACP Brandon Burns, DO  Available on Teams sara    Spoke with brother Henry Friday for updates.    P4D after results of capsule endoscopy and blood cultures come back. Plan for DC to [facility] per updated PT recs.

## 2023-04-09 NOTE — PROGRESS NOTE ADULT - SUBJECTIVE AND OBJECTIVE BOX
[facility] Division of Hospital Medicine  Lilli Burns DO  Available on Teams [place]    Patient is a 72y old  Female who presents with a chief complaint of Cough (2023 12:28)      SUBJECTIVE / OVERNIGHT EVENTS: Febrile overnight. This morning patient feels tired but has no other complaints, no SOB, abdominal pain, dysuria. Worked with PT this morning. Had 1 loose BM this morning.   ADDITIONAL REVIEW OF SYSTEMS: negative    MEDICATIONS  (STANDING):  chlorhexidine 2% Cloths 1 Application(s) Topical daily  ferrous    sulfate 325 milliGRAM(s) Oral daily  pantoprazole    Tablet 40 milliGRAM(s) Oral before breakfast    MEDICATIONS  (PRN):  acetaminophen     Tablet .. 650 milliGRAM(s) Oral every 6 hours PRN Temp greater or equal to 38C (100.4F), Mild Pain (1 - 3)      CAPILLARY BLOOD GLUCOSE        I&O's Summary    2023 07:01  -  2023 07:00  --------------------------------------------------------  IN: 480 mL / OUT: 800 mL / NET: -320 mL        PHYSICAL EXAM:  Vital Signs Last 24 Hrs  T(C): 36.7 (2023 06:01), Max: 38.7 (2023 20:55)  T(F): 98.1 (2023 06:01), Max: 101.6 (2023 20:55)  HR: 100 (2023 10:28) (88 - 100)  BP: 130/76 (2023 10:28) (111/77 - 130/76)  BP(mean): --  RR: 18 (2023 10:28) (18 - 18)  SpO2: 96% (2023 10:28) (96% - 97%)    Parameters below as of 2023 10:28  Patient On (Oxygen Delivery Method): room air      CONSTITUTIONAL: Well-groomed, in no apparent distress  EYES: No conjunctival or scleral injection, non-icteric; PERRLA and symmetric  ENMT: No external nasal lesions; no pharyngeal injection or exudates, oral mucosa with moist membranes  NECK: Trachea midline without palpable neck mass; thyroid not enlarged and non-tender  RESPIRATORY: Breathing comfortably; lungs CTA without wheeze/rhonchi/rales  CARDIOVASCULAR: +S1S2, RRR, no murmurs, pedal pulses full and symmetric; no lower extremity edema  GASTROINTESTINAL: No palpable masses or tenderness, +BS throughout, no rebound/guarding; no hepatosplenomegaly; no hernia palpated  MUSCULOSKELETAL: no digital clubbing or cyanosis; no paraspinal tenderness; normal strength and tone of extremities  SKIN: No rashes or ulcers noted; no subcutaneous nodules or induration palpable  NEUROLOGIC: CN II-XII intact; sensation intact in LEs b/l to light touch  PSYCHIATRIC: A+O x 3; mood and affect appropriate; appropriate insight and judgment    LABS:                        8.5    14.70 )-----------( 552      ( 2023 07:18 )             27.9     04-09    138  |  104  |  14  ----------------------------<  129<H>  3.4<L>   |  23  |  1.61<H>    Ca    8.1<L>      2023 07:18  Phos  3.1     04-  Mg     1.8     04-            Urinalysis Basic - ( 2023 23:32 )    Color: Yellow / Appearance: Slightly Turbid / S.015 / pH: x  Gluc: x / Ketone: Negative  / Bili: Negative / Urobili: Negative   Blood: x / Protein: 100 mg/dL / Nitrite: Negative   Leuk Esterase: Negative / RBC: 10 /hpf / WBC 6 /HPF   Sq Epi: x / Non Sq Epi: x / Bacteria: Negative

## 2023-04-10 DIAGNOSIS — D50.9 IRON DEFICIENCY ANEMIA, UNSPECIFIED: ICD-10-CM

## 2023-04-10 LAB
ANION GAP SERPL CALC-SCNC: 10 MMOL/L — SIGNIFICANT CHANGE UP (ref 5–17)
BUN SERPL-MCNC: 14 MG/DL — SIGNIFICANT CHANGE UP (ref 7–23)
CALCIUM SERPL-MCNC: 8.4 MG/DL — SIGNIFICANT CHANGE UP (ref 8.4–10.5)
CHLORIDE SERPL-SCNC: 106 MMOL/L — SIGNIFICANT CHANGE UP (ref 96–108)
CO2 SERPL-SCNC: 22 MMOL/L — SIGNIFICANT CHANGE UP (ref 22–31)
CREAT SERPL-MCNC: 1.49 MG/DL — HIGH (ref 0.5–1.3)
EGFR: 37 ML/MIN/1.73M2 — LOW
GLUCOSE SERPL-MCNC: 111 MG/DL — HIGH (ref 70–99)
HCT VFR BLD CALC: 27.7 % — LOW (ref 34.5–45)
HGB BLD-MCNC: 8.4 G/DL — LOW (ref 11.5–15.5)
MAGNESIUM SERPL-MCNC: 2 MG/DL — SIGNIFICANT CHANGE UP (ref 1.6–2.6)
MCHC RBC-ENTMCNC: 24.6 PG — LOW (ref 27–34)
MCHC RBC-ENTMCNC: 30.3 GM/DL — LOW (ref 32–36)
MCV RBC AUTO: 81.2 FL — SIGNIFICANT CHANGE UP (ref 80–100)
NRBC # BLD: 0 /100 WBCS — SIGNIFICANT CHANGE UP (ref 0–0)
PHOSPHATE SERPL-MCNC: 2.7 MG/DL — SIGNIFICANT CHANGE UP (ref 2.5–4.5)
PLATELET # BLD AUTO: 587 K/UL — HIGH (ref 150–400)
POTASSIUM SERPL-MCNC: 3.6 MMOL/L — SIGNIFICANT CHANGE UP (ref 3.5–5.3)
POTASSIUM SERPL-SCNC: 3.6 MMOL/L — SIGNIFICANT CHANGE UP (ref 3.5–5.3)
RBC # BLD: 3.41 M/UL — LOW (ref 3.8–5.2)
RBC # FLD: 19.3 % — HIGH (ref 10.3–14.5)
SODIUM SERPL-SCNC: 138 MMOL/L — SIGNIFICANT CHANGE UP (ref 135–145)
WBC # BLD: 15.99 K/UL — HIGH (ref 3.8–10.5)
WBC # FLD AUTO: 15.99 K/UL — HIGH (ref 3.8–10.5)

## 2023-04-10 PROCEDURE — 99232 SBSQ HOSP IP/OBS MODERATE 35: CPT

## 2023-04-10 RX ORDER — ACETAMINOPHEN 500 MG
1000 TABLET ORAL ONCE
Refills: 0 | Status: COMPLETED | OUTPATIENT
Start: 2023-04-10 | End: 2023-04-10

## 2023-04-10 RX ORDER — ENOXAPARIN SODIUM 100 MG/ML
40 INJECTION SUBCUTANEOUS EVERY 24 HOURS
Refills: 0 | Status: DISCONTINUED | OUTPATIENT
Start: 2023-04-10 | End: 2023-04-13

## 2023-04-10 RX ADMIN — Medication 325 MILLIGRAM(S): at 11:44

## 2023-04-10 RX ADMIN — Medication 400 MILLIGRAM(S): at 21:32

## 2023-04-10 RX ADMIN — PANTOPRAZOLE SODIUM 40 MILLIGRAM(S): 20 TABLET, DELAYED RELEASE ORAL at 06:31

## 2023-04-10 RX ADMIN — CHLORHEXIDINE GLUCONATE 1 APPLICATION(S): 213 SOLUTION TOPICAL at 11:44

## 2023-04-10 RX ADMIN — ENOXAPARIN SODIUM 40 MILLIGRAM(S): 100 INJECTION SUBCUTANEOUS at 14:05

## 2023-04-10 RX ADMIN — Medication 1000 MILLIGRAM(S): at 23:54

## 2023-04-10 NOTE — PROGRESS NOTE ADULT - PROBLEM SELECTOR PLAN 7
FeNA 1% intrinsic, due to ATN, initially resolved,   Cr currently stable   US showing echogenic kidneys no hydro  Renal consult appreciated   BMP QD  s/p IVF  could be related to covid

## 2023-04-10 NOTE — PROGRESS NOTE ADULT - PROBLEM SELECTOR PLAN 3
-first tested positive 4/6, but then negative x2 within 24h, neg again on 4/9   -patient feels fatigue but no other cold symptoms  -monitor O2, not hypoxic currently, no need for remdesivir/decadron  -symptomatic treatment with tylenol, and guaifenesin if develops cough

## 2023-04-10 NOTE — PROGRESS NOTE ADULT - PROBLEM SELECTOR PLAN 8
Lovenox on hold for dropping hgb; will resume today since hgb stable   Keep Mg >2 K> 4  Regular diet    PT consult recs LIBRADO; CM aware

## 2023-04-10 NOTE — PROGRESS NOTE ADULT - PROBLEM SELECTOR PLAN 1
2/2 PNA, repeat lactate cleared, blood and urine cultures ngtd  Continue abx for resistant organism given abx use within last 90 days  s/p 7 days zosyn  -resolved    New fever 4/8 of 101F - repeat blood cultures neg to date, no other symptoms per patient. Could be due to covid.  monitor without abx for now

## 2023-04-10 NOTE — PROGRESS NOTE ADULT - SUBJECTIVE AND OBJECTIVE BOX
[facility] Division of Hospital Medicine  Luisa Martin MD  Available via MS Teams    SUBJECTIVE / OVERNIGHT EVENTS: No acute events overnight. Patient feeling well currently. Denies any chest pain or SOB. Denies any new concerns or complaints at this time.     Review of Systems:   CONSTITUTIONAL: No fever   EYES: No eye pain, visual disturbances, or discharge  ENMT: No difficulty hearing   RESPIRATORY: No SOB. No cough   CARDIOVASCULAR: No chest pain   GASTROINTESTINAL: No abdominal or epigastric pain. No nausea, vomiting, or hematemesis; No diarrhea   GENITOURINARY: No dysuria   NEUROLOGICAL: No headaches   SKIN: No itching   MUSCULOSKELETAL: No joint pain or swelling; No muscle or back pain  PSYCHIATRIC: No depression or anxiety   HEME/LYMPH: No easy bruising or bleeding gums      MEDICATIONS  (STANDING):  chlorhexidine 2% Cloths 1 Application(s) Topical daily  ferrous    sulfate 325 milliGRAM(s) Oral daily  pantoprazole    Tablet 40 milliGRAM(s) Oral before breakfast    MEDICATIONS  (PRN):  acetaminophen     Tablet .. 650 milliGRAM(s) Oral every 6 hours PRN Temp greater or equal to 38C (100.4F), Mild Pain (1 - 3)      I&O's Summary    2023 07:01  -  10 Apr 2023 07:00  --------------------------------------------------------  IN: 480 mL / OUT: 500 mL / NET: -20 mL    10 Apr 2023 07:01  -  10 Apr 2023 12:28  --------------------------------------------------------  IN: 0 mL / OUT: 350 mL / NET: -350 mL      PHYSICAL EXAM:  Vital Signs Last 24 Hrs  T(C): 37 (10 Apr 2023 10:25), Max: 37.1 (2023 13:35)  T(F): 98.6 (10 Apr 2023 10:25), Max: 98.8 (2023 13:35)  HR: 91 (10 Apr 2023 10:25) (91 - 100)  BP: 113/73 (10 Apr 2023 10:25) (101/72 - 126/85)  RR: 18 (10 Apr 2023 10:25) (18 - 18)  SpO2: 98% (10 Apr 2023 10:25) (97% - 98%)    Parameters below as of 10 Apr 2023 10:25  Patient On (Oxygen Delivery Method): room air      CONSTITUTIONAL: NAD, well-developed   EYES: PERRLA; conjunctiva and sclera clear  ENMT: Moist oral mucosa, no pharyngeal injection or exudates   NECK: Supple   RESPIRATORY: Normal respiratory effort; lungs are clear to auscultation bilaterally  CARDIOVASCULAR: Regular rate and rhythm, normal S1 and S2, no murmur   ABDOMEN: Nontender to palpation, normoactive bowel sounds   MUSCULOSKELETAL: no clubbing or cyanosis of digits; no joint swelling or tenderness to palpation  PSYCH: A+O to person, place, and time; affect appropriate  NEUROLOGY: no gross sensory deficits   SKIN: No rashes     LABS:                        8.4    15.99 )-----------( 587      ( 10 Apr 2023 07:04 )             27.7     04-10    138  |  106  |  14  ----------------------------<  111<H>  3.6   |  22  |  1.49<H>    Ca    8.4      10 Apr 2023 07:04  Phos  2.7     04-10  Mg     2.0     04-10        Urinalysis Basic - ( 2023 16:15 )    Color: Yellow / Appearance: Slightly Turbid / S.016 / pH: x  Gluc: x / Ketone: Negative  / Bili: Negative / Urobili: Negative   Blood: x / Protein: 100 mg/dL / Nitrite: Negative   Leuk Esterase: Negative / RBC: 11 /hpf / WBC 16 /HPF   Sq Epi: x / Non Sq Epi: x / Bacteria: Negative      Culture - Blood (collected 2023 21:37)  Source: .Blood Blood-Peripheral  Preliminary Report (10 Apr 2023 03:01):    No growth to date.    Culture - Blood (collected 2023 21:36)  Source: .Blood Blood-Peripheral  Preliminary Report (10 Apr 2023 03:01):    No growth to date.      COVID-19 PCR: NotDetec (2023 10:46)  COVID-19 PCR: NotDetec (2023 12:51)  SARS-CoV-2: NotDetec (2023 07:04)  COVID-19 PCR: Detected (2023 17:51)  COVID-19 PCR: NotDetec (2023 16:19)  SARS-CoV-2: NotDetec (31 Mar 2023 03:24)      RADIOLOGY & ADDITIONAL TESTS:  New Imaging Personally Reviewed Today:  New Electrocardiogram Personally Reviewed Today:  Other Results Reviewed Today:   Prior or Outpatient Records Reviewed Today with Summary:    COORDINATION OF CARE:  Consultant Communication and Details of Discussion (where applicable):

## 2023-04-10 NOTE — PROGRESS NOTE ADULT - PROBLEM SELECTOR PLAN 6
Microcytic  No hx of bleeding, but previous hgb 12.7 last month     iron studies, reticulocyte count suggest BREA with some elevated ferritin may be reactive (still denies bleeding)  Normal B12 and folic acid (doesn't eat meat but supplements)    LDH/Hapto both high unlikely hemolyzing and bili normal  Keep active t/s, transfuse < 7 (1U given 4/5)  s/p IV iron x5 days  Heme f/u, will refer to [facility] heme upon discharge  ?MM w/u- renal and heme following    -GI following, s/p EGD/colonoscopy both no e/o bleeding, except gastritis, capsule endoscopy done with no signs of bleeding noted; non-bleeding red spot, likely small angiodysplasia was seen; two additional non-bleeding vascular lesions characterized by hyperpigmentation seen

## 2023-04-11 DIAGNOSIS — R50.9 FEVER, UNSPECIFIED: ICD-10-CM

## 2023-04-11 LAB
ANION GAP SERPL CALC-SCNC: 11 MMOL/L — SIGNIFICANT CHANGE UP (ref 5–17)
BLD GP AB SCN SERPL QL: NEGATIVE — SIGNIFICANT CHANGE UP
BUN SERPL-MCNC: 19 MG/DL — SIGNIFICANT CHANGE UP (ref 7–23)
CALCIUM SERPL-MCNC: 8.1 MG/DL — LOW (ref 8.4–10.5)
CHLORIDE SERPL-SCNC: 105 MMOL/L — SIGNIFICANT CHANGE UP (ref 96–108)
CO2 SERPL-SCNC: 22 MMOL/L — SIGNIFICANT CHANGE UP (ref 22–31)
CREAT SERPL-MCNC: 1.56 MG/DL — HIGH (ref 0.5–1.3)
EGFR: 35 ML/MIN/1.73M2 — LOW
GLUCOSE SERPL-MCNC: 107 MG/DL — HIGH (ref 70–99)
HCT VFR BLD CALC: 26.4 % — LOW (ref 34.5–45)
HGB BLD-MCNC: 8 G/DL — LOW (ref 11.5–15.5)
MCHC RBC-ENTMCNC: 24.9 PG — LOW (ref 27–34)
MCHC RBC-ENTMCNC: 30.3 GM/DL — LOW (ref 32–36)
MCV RBC AUTO: 82.2 FL — SIGNIFICANT CHANGE UP (ref 80–100)
NRBC # BLD: 0 /100 WBCS — SIGNIFICANT CHANGE UP (ref 0–0)
PLATELET # BLD AUTO: 545 K/UL — HIGH (ref 150–400)
POTASSIUM SERPL-MCNC: 3.4 MMOL/L — LOW (ref 3.5–5.3)
POTASSIUM SERPL-SCNC: 3.4 MMOL/L — LOW (ref 3.5–5.3)
RBC # BLD: 3.21 M/UL — LOW (ref 3.8–5.2)
RBC # FLD: 19.3 % — HIGH (ref 10.3–14.5)
RH IG SCN BLD-IMP: POSITIVE — SIGNIFICANT CHANGE UP
SARS-COV-2 RNA SPEC QL NAA+PROBE: SIGNIFICANT CHANGE UP
SODIUM SERPL-SCNC: 138 MMOL/L — SIGNIFICANT CHANGE UP (ref 135–145)
WBC # BLD: 13.42 K/UL — HIGH (ref 3.8–10.5)
WBC # FLD AUTO: 13.42 K/UL — HIGH (ref 3.8–10.5)

## 2023-04-11 PROCEDURE — 99232 SBSQ HOSP IP/OBS MODERATE 35: CPT

## 2023-04-11 RX ORDER — POTASSIUM CHLORIDE 20 MEQ
40 PACKET (EA) ORAL EVERY 4 HOURS
Refills: 0 | Status: COMPLETED | OUTPATIENT
Start: 2023-04-11 | End: 2023-04-11

## 2023-04-11 RX ORDER — POTASSIUM CHLORIDE 20 MEQ
40 PACKET (EA) ORAL ONCE
Refills: 0 | Status: COMPLETED | OUTPATIENT
Start: 2023-04-11 | End: 2023-04-11

## 2023-04-11 RX ADMIN — Medication 40 MILLIEQUIVALENT(S): at 14:21

## 2023-04-11 RX ADMIN — Medication 325 MILLIGRAM(S): at 11:55

## 2023-04-11 RX ADMIN — PANTOPRAZOLE SODIUM 40 MILLIGRAM(S): 20 TABLET, DELAYED RELEASE ORAL at 05:26

## 2023-04-11 RX ADMIN — CHLORHEXIDINE GLUCONATE 1 APPLICATION(S): 213 SOLUTION TOPICAL at 11:55

## 2023-04-11 RX ADMIN — Medication 40 MILLIEQUIVALENT(S): at 17:59

## 2023-04-11 RX ADMIN — ENOXAPARIN SODIUM 40 MILLIGRAM(S): 100 INJECTION SUBCUTANEOUS at 11:56

## 2023-04-11 NOTE — PROGRESS NOTE ADULT - PROBLEM SELECTOR PLAN 1
2/2 PNA, repeat lactate cleared, blood and urine cultures ngtd  Continue abx for resistant organism given abx use within last 90 days  s/p 7 days zosyn  -resolved    New fever 4/8 of 101F - repeat blood cultures neg to date, no other symptoms per patient. Could be due to covid.  monitor without abx for now - noted to have fever of 102.2 over night 4/11  - no other symptoms; may be related to COVID?  - hold off on abx for now; f/u blood cx sent overnight   - possible DC tomorrow if remains afebrile

## 2023-04-11 NOTE — PROGRESS NOTE ADULT - PROBLEM SELECTOR PLAN 2
As above 2/2 PNA, repeat lactate cleared, blood and urine cultures ngtd  Continue abx for resistant organism given abx use within last 90 days  s/p 7 days zosyn  -resolved    New fever 4/8 of 101F - repeat blood cultures neg to date, no other symptoms per patient. Could be due to covid.  monitor without abx for now

## 2023-04-11 NOTE — DIETITIAN INITIAL EVALUATION ADULT - PERTINENT MEDS FT
MEDICATIONS  (STANDING):  chlorhexidine 2% Cloths 1 Application(s) Topical daily  enoxaparin Injectable 40 milliGRAM(s) SubCutaneous every 24 hours  ferrous    sulfate 325 milliGRAM(s) Oral daily  pantoprazole    Tablet 40 milliGRAM(s) Oral before breakfast    MEDICATIONS  (PRN):  acetaminophen     Tablet .. 650 milliGRAM(s) Oral every 6 hours PRN Temp greater or equal to 38C (100.4F), Mild Pain (1 - 3)

## 2023-04-11 NOTE — DIETITIAN INITIAL EVALUATION ADULT - REASON FOR ADMISSION
Pneumonia due to infectious organism    Chart reviewed, events noted. This is a "72F pmhx of recent pneumonia presents with severe sepsis 2/2 pneumonia." s/p Zosyn x 7 days, now resolved. Covid positive on 4/6, noted with 3 negative tests afterwards, most recently on 4/9.

## 2023-04-11 NOTE — PROGRESS NOTE ADULT - PROBLEM SELECTOR PLAN 7
FeNA 1% intrinsic, due to ATN, initially resolved,   Cr currently stable   US showing echogenic kidneys no hydro  Renal consult appreciated   BMP QD  s/p IVF  could be related to covid Microcytic  No hx of bleeding, but previous hgb 12.7 last month     iron studies, reticulocyte count suggest BREA with some elevated ferritin may be reactive (still denies bleeding)  Normal B12 and folic acid (doesn't eat meat but supplements)    LDH/Hapto both high unlikely hemolyzing and bili normal  Keep active t/s, transfuse < 7 (1U given 4/5)  s/p IV iron x5 days  Heme f/u, will refer to [facility] heme upon discharge  ?MM w/u- renal and heme following    -GI following, s/p EGD/colonoscopy both no e/o bleeding, except gastritis, capsule endoscopy done with no signs of bleeding noted; non-bleeding red spot, likely small angiodysplasia was seen; two additional non-bleeding vascular lesions characterized by hyperpigmentation seen

## 2023-04-11 NOTE — DIETITIAN INITIAL EVALUATION ADULT - ORAL INTAKE PTA/DIET HISTORY
Pt reports overall good PO intake and appetite PTA, consumes 3 meals per day but notes portions are smaller than what she is receiving in-house. NKFA. Pt denies chewing/swallowing difficulty, nausea, vomiting, diarrhea, constipation.

## 2023-04-11 NOTE — PROGRESS NOTE ADULT - PROBLEM SELECTOR PLAN 5
Downtrended but delta change is 15  Pt also complaining of SOB and weakness - however TTE WNL  EKG sinus tachycardia no ischemic changes No hx of this  Likely reactive

## 2023-04-11 NOTE — PROGRESS NOTE ADULT - PROBLEM SELECTOR PLAN 4
No hx of this  Likely reactive -first tested positive 4/6, but then negative x2 within 24h, neg again on 4/9   -patient feels fatigue but no other cold symptoms  -monitor O2, not hypoxic currently, no need for remdesivir/decadron  -symptomatic treatment with tylenol, and guaifenesin if develops cough

## 2023-04-11 NOTE — DIETITIAN INITIAL EVALUATION ADULT - IDEAL BODY WEIGHT (KG)
no bleeding at site/no blood in mucus/no chills/no drainage/no fever/no pain/no purulent drainage/no rectal pain/no redness/no vomiting 72.5

## 2023-04-11 NOTE — DIETITIAN INITIAL EVALUATION ADULT - PERTINENT LABORATORY DATA
04-11    138  |  105  |  19  ----------------------------<  107<H>  3.4<L>   |  22  |  1.56<H>    Ca    8.1<L>      11 Apr 2023 07:01  Phos  2.7     04-10  Mg     2.0     04-10

## 2023-04-11 NOTE — DIETITIAN INITIAL EVALUATION ADULT - ENERGY INTAKE
Fair (50-75%) In-house pt reports good appetite, consuming ~ 50-75% of meals per reports, states portions are much larger than she is used to eating so she is unable to complete. Consume 1 pancake for breakfast so far and plans to finish eggs and valdes. No acute GI distress noted. Offered to obtain food preferences but pt declining, declines need for nutrition supplementation, has been prioritizing protein-rich foods.  Patient with no nutrition-related questions at this time. Made aware RD remains available as needed.

## 2023-04-11 NOTE — PROGRESS NOTE ADULT - PROBLEM SELECTOR PLAN 3
-first tested positive 4/6, but then negative x2 within 24h, neg again on 4/9   -patient feels fatigue but no other cold symptoms  -monitor O2, not hypoxic currently, no need for remdesivir/decadron  -symptomatic treatment with tylenol, and guaifenesin if develops cough As above

## 2023-04-11 NOTE — DIETITIAN INITIAL EVALUATION ADULT - ADD RECOMMEND
1) Continue current diet as tolerated. 2) Encourage PO intake of protein-rich foods. 3) RD to remain available and follow-up as medically appropriate.

## 2023-04-11 NOTE — DIETITIAN INITIAL EVALUATION ADULT - OTHER INFO
Weight: pt unsure of UBW but reports overall stable weight, may have lost a few pounds but unable to quantify amount or time frame. Weight noted as 140lbs (2/16/23) Current dosing weight is 134.6lbs. Weight loss x 2 months not clinically significant, will continue to monitor.  minimum assist (75% patients effort)

## 2023-04-11 NOTE — PROGRESS NOTE ADULT - PROBLEM SELECTOR PLAN 9
Lovenox for DVT ppx   Keep Mg >2 K> 4  Regular diet    PT consult recs LIBRADO; CM aware    DC planning likely tomorrow if remains afebrile

## 2023-04-11 NOTE — PROGRESS NOTE ADULT - PROBLEM SELECTOR PLAN 6
Microcytic  No hx of bleeding, but previous hgb 12.7 last month     iron studies, reticulocyte count suggest BREA with some elevated ferritin may be reactive (still denies bleeding)  Normal B12 and folic acid (doesn't eat meat but supplements)    LDH/Hapto both high unlikely hemolyzing and bili normal  Keep active t/s, transfuse < 7 (1U given 4/5)  s/p IV iron x5 days  Heme f/u, will refer to [facility] heme upon discharge  ?MM w/u- renal and heme following    -GI following, s/p EGD/colonoscopy both no e/o bleeding, except gastritis, capsule endoscopy done with no signs of bleeding noted; non-bleeding red spot, likely small angiodysplasia was seen; two additional non-bleeding vascular lesions characterized by hyperpigmentation seen Downtrended but delta change is 15  Pt also complaining of SOB and weakness - however TTE WNL  EKG sinus tachycardia no ischemic changes

## 2023-04-11 NOTE — PROGRESS NOTE ADULT - SUBJECTIVE AND OBJECTIVE BOX
[facility] Division of Hospital Medicine  Luisa Martin MD  Available via MS Teams    SUBJECTIVE / OVERNIGHT EVENTS: Noted to have fever up to 102.2 overnight. Denies any symptoms. No chest pain or SOB. Denies any other concerns or complaints at this time.     Review of Systems:   CONSTITUTIONAL: No chills   EYES: No eye pain, visual disturbances, or discharge  ENMT: No difficulty hearing   RESPIRATORY: No SOB. No cough   CARDIOVASCULAR: No chest pain   GASTROINTESTINAL: No abdominal or epigastric pain. No nausea, vomiting, or hematemesis; No diarrhea    GENITOURINARY: No dysuria   NEUROLOGICAL: No headaches   SKIN: No itching    MUSCULOSKELETAL: No joint pain or swelling; No muscle or back pain  PSYCHIATRIC: No depression or anxiety   HEME/LYMPH: No easy bruising or bleeding gums      MEDICATIONS  (STANDING):  chlorhexidine 2% Cloths 1 Application(s) Topical daily  enoxaparin Injectable 40 milliGRAM(s) SubCutaneous every 24 hours  ferrous    sulfate 325 milliGRAM(s) Oral daily  pantoprazole    Tablet 40 milliGRAM(s) Oral before breakfast    MEDICATIONS  (PRN):  acetaminophen     Tablet .. 650 milliGRAM(s) Oral every 6 hours PRN Temp greater or equal to 38C (100.4F), Mild Pain (1 - 3)    I&O's Summary    10 Apr 2023 07:  -  2023 07:00  --------------------------------------------------------  IN: 700 mL / OUT: 850 mL / NET: -150 mL    2023 07:01  -  2023 12:31  --------------------------------------------------------  IN: 220 mL / OUT: 275 mL / NET: -55 mL      PHYSICAL EXAM:  Vital Signs Last 24 Hrs  T(C): 36.6 (2023 09:36), Max: 39 (10 Apr 2023 20:00)  T(F): 97.8 (2023 09:36), Max: 102.2 (10 Apr 2023 20:00)  HR: 94 (2023 09:36) (86 - 100)  BP: 101/68 (2023 09:36) (101/68 - 127/82)  RR: 18 (2023 09:36) (18 - 18)  SpO2: 97% (2023 09:36) (96% - 99%)    Parameters below as of 2023 09:36  Patient On (Oxygen Delivery Method): room air      CONSTITUTIONAL: NAD, well-developed   EYES: PERRLA; conjunctiva and sclera clear  ENMT: Moist oral mucosa, no pharyngeal injection or exudates   NECK: Supple   RESPIRATORY: Normal respiratory effort; lungs are clear to auscultation bilaterally  CARDIOVASCULAR: Regular rate and rhythm, normal S1 and S2, no murmur   ABDOMEN: Nontender to palpation, normoactive bowel sounds   MUSCULOSKELETAL: no clubbing or cyanosis of digits; no joint swelling or tenderness to palpation  PSYCH: A+O to person, place, and time; affect appropriate  NEUROLOGY: no gross sensory deficits   SKIN: No rashes     LABS:                        8.0    13.42 )-----------( 545      ( 2023 07:02 )             26.4     04-11    138  |  105  |  19  ----------------------------<  107<H>  3.4<L>   |  22  |  1.56<H>    Ca    8.1<L>      2023 07:01  Phos  2.7     04-10  Mg     2.0     04-10      Urinalysis Basic - ( 2023 16:15 )    Color: Yellow / Appearance: Slightly Turbid / S.016 / pH: x  Gluc: x / Ketone: Negative  / Bili: Negative / Urobili: Negative   Blood: x / Protein: 100 mg/dL / Nitrite: Negative   Leuk Esterase: Negative / RBC: 11 /hpf / WBC 16 /HPF   Sq Epi: x / Non Sq Epi: x / Bacteria: Negative        Culture - Blood (collected 2023 21:37)  Source: .Blood Blood-Peripheral  Preliminary Report (10 Apr 2023 03:01):    No growth to date.    Culture - Blood (collected 2023 21:36)  Source: .Blood Blood-Peripheral  Preliminary Report (10 Apr 2023 03:01):    No growth to date.      COVID-19 PCR: NotDetec (2023 10:46)  COVID-19 PCR: NotDetec (2023 12:51)  SARS-CoV-2: NotDetec (2023 07:04)  COVID-19 PCR: Detected (2023 17:51)  COVID-19 PCR: NotDetec (2023 16:19)  SARS-CoV-2: NotDetec (31 Mar 2023 03:24)      RADIOLOGY & ADDITIONAL TESTS:  New Imaging Personally Reviewed Today:  New Electrocardiogram Personally Reviewed Today:  Other Results Reviewed Today:   Prior or Outpatient Records Reviewed Today with Summary:    COORDINATION OF CARE:  Consultant Communication and Details of Discussion (where applicable):

## 2023-04-11 NOTE — PROGRESS NOTE ADULT - PROBLEM SELECTOR PLAN 8
Lovenox on hold for dropping hgb; will resume today since hgb stable   Keep Mg >2 K> 4  Regular diet    PT consult recs LIBRADO; CM aware FeNA 1% intrinsic, due to ATN, initially resolved,   Cr currently stable   US showing echogenic kidneys no hydro  Renal consult appreciated   BMP QD  s/p IVF  could be related to covid

## 2023-04-12 LAB
ANION GAP SERPL CALC-SCNC: 9 MMOL/L — SIGNIFICANT CHANGE UP (ref 5–17)
BUN SERPL-MCNC: 19 MG/DL — SIGNIFICANT CHANGE UP (ref 7–23)
CALCIUM SERPL-MCNC: 8.5 MG/DL — SIGNIFICANT CHANGE UP (ref 8.4–10.5)
CHLORIDE SERPL-SCNC: 108 MMOL/L — SIGNIFICANT CHANGE UP (ref 96–108)
CO2 SERPL-SCNC: 22 MMOL/L — SIGNIFICANT CHANGE UP (ref 22–31)
CREAT SERPL-MCNC: 1.48 MG/DL — HIGH (ref 0.5–1.3)
EGFR: 37 ML/MIN/1.73M2 — LOW
GLUCOSE SERPL-MCNC: 110 MG/DL — HIGH (ref 70–99)
HCT VFR BLD CALC: 25 % — LOW (ref 34.5–45)
HGB BLD-MCNC: 7.6 G/DL — LOW (ref 11.5–15.5)
MCHC RBC-ENTMCNC: 24.9 PG — LOW (ref 27–34)
MCHC RBC-ENTMCNC: 30.4 GM/DL — LOW (ref 32–36)
MCV RBC AUTO: 82 FL — SIGNIFICANT CHANGE UP (ref 80–100)
NRBC # BLD: 0 /100 WBCS — SIGNIFICANT CHANGE UP (ref 0–0)
PLATELET # BLD AUTO: 564 K/UL — HIGH (ref 150–400)
POTASSIUM SERPL-MCNC: 4.3 MMOL/L — SIGNIFICANT CHANGE UP (ref 3.5–5.3)
POTASSIUM SERPL-SCNC: 4.3 MMOL/L — SIGNIFICANT CHANGE UP (ref 3.5–5.3)
RBC # BLD: 3.05 M/UL — LOW (ref 3.8–5.2)
RBC # FLD: 19.7 % — HIGH (ref 10.3–14.5)
SODIUM SERPL-SCNC: 139 MMOL/L — SIGNIFICANT CHANGE UP (ref 135–145)
WBC # BLD: 11.93 K/UL — HIGH (ref 3.8–10.5)
WBC # FLD AUTO: 11.93 K/UL — HIGH (ref 3.8–10.5)

## 2023-04-12 PROCEDURE — 99232 SBSQ HOSP IP/OBS MODERATE 35: CPT

## 2023-04-12 RX ADMIN — Medication 650 MILLIGRAM(S): at 20:38

## 2023-04-12 RX ADMIN — CHLORHEXIDINE GLUCONATE 1 APPLICATION(S): 213 SOLUTION TOPICAL at 14:25

## 2023-04-12 RX ADMIN — ENOXAPARIN SODIUM 40 MILLIGRAM(S): 100 INJECTION SUBCUTANEOUS at 14:26

## 2023-04-12 RX ADMIN — PANTOPRAZOLE SODIUM 40 MILLIGRAM(S): 20 TABLET, DELAYED RELEASE ORAL at 05:24

## 2023-04-12 RX ADMIN — Medication 325 MILLIGRAM(S): at 14:25

## 2023-04-12 NOTE — PROGRESS NOTE ADULT - PROBLEM SELECTOR PLAN 2
2/2 PNA, repeat lactate cleared, blood and urine cultures ngtd  completed abx for resistant organism given abx use within last 90 days  s/p 7 days zosyn  -resolved    New fever 4/8 of 101F - repeat blood cultures neg to date, no other symptoms per patient. Could be due to covid.  monitor without abx for now

## 2023-04-12 NOTE — PROVIDER CONTACT NOTE (OTHER) - RECOMMENDATIONS
Notify provider. Isolation precautions ordered for +covid?
MD made aware, Tylenol given
PA made aware
Provider made aware. Pt given PO tylenol per order, temp reassessed 99.9. Will continue to monitor & reassess.

## 2023-04-12 NOTE — PROGRESS NOTE ADULT - SUBJECTIVE AND OBJECTIVE BOX
[facility] Division of Hospital Medicine  Luisa Martin MD  Available via MS Teams    SUBJECTIVE / OVERNIGHT EVENTS: No acute events overnight. Patient remained afebrile. Feeling well overall. Denies any chest pain or SOB. No new concerns or complaints at this time.     Review of Systems:   CONSTITUTIONAL: No fever   EYES: No eye pain, visual disturbances, or discharge  ENMT: No difficulty hearing   RESPIRATORY: No SOB. No cough   CARDIOVASCULAR: No chest pain   GASTROINTESTINAL: No abdominal or epigastric pain. No nausea, vomiting, or hematemesis; No diarrhea    GENITOURINARY: No dysuria   NEUROLOGICAL: No headache   SKIN: No itching    MUSCULOSKELETAL: No joint pain or swelling; No muscle or back pain  PSYCHIATRIC: No depression or anxiety   HEME/LYMPH: No easy bruising or bleeding gums      MEDICATIONS  (STANDING):  chlorhexidine 2% Cloths 1 Application(s) Topical daily  enoxaparin Injectable 40 milliGRAM(s) SubCutaneous every 24 hours  ferrous    sulfate 325 milliGRAM(s) Oral daily  pantoprazole    Tablet 40 milliGRAM(s) Oral before breakfast    MEDICATIONS  (PRN):  acetaminophen     Tablet .. 650 milliGRAM(s) Oral every 6 hours PRN Temp greater or equal to 38C (100.4F), Mild Pain (1 - 3)      I&O's Summary    11 Apr 2023 07:01  -  12 Apr 2023 07:00  --------------------------------------------------------  IN: 620 mL / OUT: 725 mL / NET: -105 mL    12 Apr 2023 07:01  -  12 Apr 2023 11:48  --------------------------------------------------------  IN: 180 mL / OUT: 150 mL / NET: 30 mL      PHYSICAL EXAM:  Vital Signs Last 24 Hrs  T(C): 36.9 (12 Apr 2023 09:20), Max: 37.1 (12 Apr 2023 05:56)  T(F): 98.4 (12 Apr 2023 09:20), Max: 98.7 (12 Apr 2023 05:56)  HR: 82 (12 Apr 2023 09:20) (82 - 103)  BP: 106/69 (12 Apr 2023 09:20) (97/64 - 118/76)  RR: 18 (12 Apr 2023 09:20) (18 - 18)  SpO2: 97% (12 Apr 2023 09:20) (93% - 98%)    Parameters below as of 12 Apr 2023 09:20  Patient On (Oxygen Delivery Method): room air      CONSTITUTIONAL: NAD, well-developed   EYES: PERRLA; conjunctiva and sclera clear  ENMT: Moist oral mucosa, no pharyngeal injection or exudates   NECK: Supple   RESPIRATORY: Normal respiratory effort; lungs are clear to auscultation bilaterally  CARDIOVASCULAR: Regular rate and rhythm, normal S1 and S2, no murmur   ABDOMEN: Nontender to palpation, normoactive bowel sounds   MUSCULOSKELETAL: no clubbing or cyanosis of digits; no joint swelling or tenderness to palpation  PSYCH: A+O to person, place, and time; affect appropriate  NEUROLOGY: no gross sensory deficits   SKIN: No rashes     LABS:                        7.6    11.93 )-----------( 564      ( 12 Apr 2023 06:38 )             25.0     04-12    139  |  108  |  19  ----------------------------<  110<H>  4.3   |  22  |  1.48<H>    Ca    8.5      12 Apr 2023 06:35      Culture - Blood (collected 10 Apr 2023 21:40)  Source: .Blood Blood-Peripheral  Preliminary Report (12 Apr 2023 01:02):    No growth to date.    Culture - Blood (collected 10 Apr 2023 21:39)  Source: .Blood Blood-Venous  Preliminary Report (12 Apr 2023 01:02):    No growth to date.      COVID-19 PCR: NotDetec (11 Apr 2023 12:54)  COVID-19 PCR: NotDetec (09 Apr 2023 10:46)  COVID-19 PCR: NotDetec (07 Apr 2023 12:51)  SARS-CoV-2: NotDetec (07 Apr 2023 07:04)  COVID-19 PCR: Detected (06 Apr 2023 17:51)  COVID-19 PCR: NotDetec (04 Apr 2023 16:19)  SARS-CoV-2: [facility] (31 Mar 2023 03:24)      RADIOLOGY & ADDITIONAL TESTS:  New Imaging Personally Reviewed Today:  New Electrocardiogram Personally Reviewed Today:  Other Results Reviewed Today:   Prior or Outpatient Records Reviewed Today with Summary:    COORDINATION OF CARE:  Consultant Communication and Details of Discussion (where applicable):

## 2023-04-12 NOTE — PROVIDER CONTACT NOTE (OTHER) - REASON
Patient febrile; temp 102.2
Pt spiking fever
Pt temp 100.6
covid result
Patient has a temp of 101.8 and hrt 114
Pt has temp of 101.6

## 2023-04-12 NOTE — PROVIDER CONTACT NOTE (OTHER) - ASSESSMENT
Patient other vitals are stable at this time. She is asymptomatic also.   Ice packs and Tylenol given to patient.
Pt denies pain. Oral temp 100.6. Complaining of chills. /65. . 97% on Room air.
Patient is stable; all VSS except temp 102.2; no s/s of distress; patient asymptomatic
Pt has temp of 101.6. All other vitals stable
Pt AOx4. T 101.2, , /69, RR 18, 96%o2 sat on RA. Pt denies pain or distress; no chills, sweating, other abnormal s/s noted.
Patient denies chest pain or SOB.   /71, HR elevated 115, temp 100.1F, RR 18, O2 96% on RA.

## 2023-04-12 NOTE — PROGRESS NOTE ADULT - PROBLEM SELECTOR PLAN 1
- noted to have fever of 102.2 over night 4/11  - no other symptoms; may be related to COVID?  - afebrile for past 24 hours   - hold off on abx; repeat blood cx 4/10 neg to date    - DC planning to [facility]

## 2023-04-12 NOTE — PROVIDER CONTACT NOTE (OTHER) - ACTION/TREATMENT ORDERED:
MD to order blood cultures. Plan of care continues.
NP aware. Awaiting isolation orders. Will continue to monitor patient.
Provider made aware, "team has been aware that pt spiking fevers." No new orders in moment.
Administer PO Tylenol. No other orders at this time.
Provider states she will order for blood cultures for patient
Per PA, Tylenol IV and blood culture was ordered

## 2023-04-12 NOTE — PROGRESS NOTE ADULT - PROBLEM SELECTOR PLAN 9
Lovenox for DVT ppx   Keep Mg >2 K> 4  Regular diet    PT consult recs [facility]    DC planning to [facility]; CM aware

## 2023-04-12 NOTE — PROGRESS NOTE ADULT - PROBLEM SELECTOR PLAN 7
Microcytic  No hx of bleeding, but previous hgb 12.7 last month     iron studies, reticulocyte count suggest BREA with some elevated ferritin may be reactive (still denies bleeding)  Normal B12 and folic acid (doesn't eat meat but supplements)    LDH/Hapto both high unlikely hemolyzing and bili normal  Keep active t/s, transfuse < 7 (1U given 4/5)  s/p IV iron x5 days  Heme f/u, will refer to [facility] heme upon discharge  ?MM w/u- renal and heme following    -GI following, s/p EGD/colonoscopy both no e/o bleeding, except gastritis, capsule endoscopy done with no signs of bleeding noted; non-bleeding red spot, likely small angiodysplasia was seen; two additional non-bleeding vascular lesions characterized by hyperpigmentation seen

## 2023-04-12 NOTE — PROVIDER CONTACT NOTE (OTHER) - BACKGROUND
Pt admitted w weakness & anemia; covid + 4/9. Has been spiking fevers throughout hospital stay, workup done 4/4, 4/8
Patient is here for pneumonia.
Pt admitted 3/31 for pneumonia.
Patient admitted 3/31/23 with pneumonia and generalized weakness.
Patient came in for weakness and anemia
Pt had pneumonia and then tested positive for COVID

## 2023-04-12 NOTE — PROVIDER CONTACT NOTE (OTHER) - SITUATION
Patient febrile; temp 102.2
Patient has a temperature of 101.8 at this time   She is asymptomatic at this time.
Pt T 101.2, 
Pt has temp of 101.6
Patient transferred from [address] [place] [address] called floor with positive covid results for pt.
Pt oral temp 100.6.

## 2023-04-12 NOTE — PROVIDER CONTACT NOTE (OTHER) - DATE AND TIME:
07-Apr-2023 01:13
10-Apr-2023 22:24
01-Apr-2023 23:45
02-Apr-2023 23:32
12-Apr-2023 21:00
08-Apr-2023 20:56

## 2023-04-13 ENCOUNTER — TRANSCRIPTION ENCOUNTER (OUTPATIENT)
Age: 73
End: 2023-04-13

## 2023-04-13 VITALS
DIASTOLIC BLOOD PRESSURE: 65 MMHG | RESPIRATION RATE: 18 BRPM | OXYGEN SATURATION: 95 % | TEMPERATURE: 99 F | HEART RATE: 90 BPM | SYSTOLIC BLOOD PRESSURE: 115 MMHG

## 2023-04-13 LAB
HCT VFR BLD CALC: 26.8 % — LOW (ref 34.5–45)
HGB BLD-MCNC: 8.1 G/DL — LOW (ref 11.5–15.5)
MCHC RBC-ENTMCNC: 25.2 PG — LOW (ref 27–34)
MCHC RBC-ENTMCNC: 30.2 GM/DL — LOW (ref 32–36)
MCV RBC AUTO: 83.2 FL — SIGNIFICANT CHANGE UP (ref 80–100)
NRBC # BLD: 0 /100 WBCS — SIGNIFICANT CHANGE UP (ref 0–0)
PLATELET # BLD AUTO: 591 K/UL — HIGH (ref 150–400)
RBC # BLD: 3.22 M/UL — LOW (ref 3.8–5.2)
RBC # FLD: 19.8 % — HIGH (ref 10.3–14.5)
WBC # BLD: 11.74 K/UL — HIGH (ref 3.8–10.5)
WBC # FLD AUTO: 11.74 K/UL — HIGH (ref 3.8–10.5)

## 2023-04-13 PROCEDURE — 99239 HOSP IP/OBS DSCHRG MGMT >30: CPT

## 2023-04-13 RX ADMIN — ENOXAPARIN SODIUM 40 MILLIGRAM(S): 100 INJECTION SUBCUTANEOUS at 14:17

## 2023-04-13 RX ADMIN — PANTOPRAZOLE SODIUM 40 MILLIGRAM(S): 20 TABLET, DELAYED RELEASE ORAL at 06:06

## 2023-04-13 RX ADMIN — CHLORHEXIDINE GLUCONATE 1 APPLICATION(S): 213 SOLUTION TOPICAL at 11:59

## 2023-04-13 RX ADMIN — Medication 325 MILLIGRAM(S): at 12:00

## 2023-04-13 NOTE — PROGRESS NOTE ADULT - PROBLEM SELECTOR PLAN 1
- noted to have fever of 102.2 over night 4/11; another fever 101.2 overnight 4/13  - no other symptoms; likely related to COVID  - blood cx neg x4    - no other symptoms; WBC not rising   - continue to hold off on abx     - DC planning to [facility]

## 2023-04-13 NOTE — DISCHARGE NOTE NURSING/CASE MANAGEMENT/SOCIAL WORK - PATIENT PORTAL LINK FT
You can access the FollowMyHealth Patient Portal offered by [facility] by registering at the following website: [email or web address]. By joining [facility]’s FollowMyHealth portal, you will also be able to view your health information using other applications (apps) compatible with our system.

## 2023-04-13 NOTE — PROGRESS NOTE ADULT - PROBLEM SELECTOR PLAN 7
Microcytic  No hx of bleeding, but previous hgb 12.7 last month     iron studies, reticulocyte count suggest BREA with some elevated ferritin may be reactive (still denies bleeding)  Normal B12 and folic acid (doesn't eat meat but supplements)    LDH/Hapto both high unlikely hemolyzing and bili normal  Keep active t/s, transfuse < 7 (1U given 4/5)  s/p IV iron x5 days  Heme f/u, will refer to [facility] heme upon discharge  ?MM w/u- renal and heme following    -GI following, s/p EGD/colonoscopy both no e/o bleeding, except gastritis, capsule endoscopy done with no signs of bleeding noted; non-bleeding red spot, likely small angiodysplasia was seen; two additional non-bleeding vascular lesions characterized by hyperpigmentation seen

## 2023-04-13 NOTE — PROGRESS NOTE ADULT - REASON FOR ADMISSION
Cough

## 2023-04-13 NOTE — PROGRESS NOTE ADULT - PROBLEM SELECTOR PLAN 2
2/2 PNA, repeat lactate cleared, blood and urine cultures ngtd  completed abx for resistant organism given abx use within last 90 days  s/p 7 days zosyn  -resolved

## 2023-04-13 NOTE — PROGRESS NOTE ADULT - PROBLEM SELECTOR PLAN 9
Lovenox for DVT ppx   Keep Mg >2 K> 4  Regular diet    PT consult recs [facility]    DC planning to [facility]; CM aware Lovenox for DVT ppx   Keep Mg >2 K> 4  Regular diet    PT consult recs [facility]    DC planning to [facility] today   40 mins spent on DC planning

## 2023-04-13 NOTE — PROGRESS NOTE ADULT - SUBJECTIVE AND OBJECTIVE BOX
[facility] Division of Hospital Medicine  Luisa Martin MD  Available via MS Teams    SUBJECTIVE / OVERNIGHT EVENTS: Noted to have episode of fever overnight to 101.2. Denies any chest pain or SOB. Denies any new symptoms. Feeling well overall. No other concerns or complaints at this time.     Review of Systems:   CONSTITUTIONAL: No chills    EYES: No eye pain, visual disturbances, or discharge  ENMT: No difficulty hearing   RESPIRATORY: No SOB. No cough   CARDIOVASCULAR: No chest pain   GASTROINTESTINAL: No abdominal or epigastric pain. No nausea, vomiting, or hematemesis; No diarrhea    GENITOURINARY: No dysuria   NEUROLOGICAL: No headache   SKIN: No itching   MUSCULOSKELETAL: No joint pain or swelling; No muscle or back pain  PSYCHIATRIC: No depression or anxiety   HEME/LYMPH: No easy bruising or bleeding gums      MEDICATIONS  (STANDING):  chlorhexidine 2% Cloths 1 Application(s) Topical daily  enoxaparin Injectable 40 milliGRAM(s) SubCutaneous every 24 hours  ferrous    sulfate 325 milliGRAM(s) Oral daily  pantoprazole    Tablet 40 milliGRAM(s) Oral before breakfast    MEDICATIONS  (PRN):  acetaminophen     Tablet .. 650 milliGRAM(s) Oral every 6 hours PRN Temp greater or equal to 38C (100.4F), Mild Pain (1 - 3)      I&O's Summary    12 Apr 2023 07:01  -  13 Apr 2023 07:00  --------------------------------------------------------  IN: 600 mL / OUT: 500 mL / NET: 100 mL    13 Apr 2023 07:01  -  13 Apr 2023 13:13  --------------------------------------------------------  IN: 240 mL / OUT: 200 mL / NET: 40 mL      PHYSICAL EXAM:  Vital Signs Last 24 Hrs  T(C): 37.2 (13 Apr 2023 09:57), Max: 38.4 (12 Apr 2023 20:00)  T(F): 98.9 (13 Apr 2023 09:57), Max: 101.2 (12 Apr 2023 20:00)  HR: 90 (13 Apr 2023 09:57) (88 - 105)  BP: 115/65 (13 Apr 2023 09:57) (93/61 - 115/65)  RR: 18 (13 Apr 2023 09:57) (18 - 18)  SpO2: 95% (13 Apr 2023 09:57) (95% - 96%)    Parameters below as of 13 Apr 2023 09:57  Patient On (Oxygen Delivery Method): room air      CONSTITUTIONAL: NAD, well-developed   EYES: PERRLA; conjunctiva and sclera clear  ENMT: Moist oral mucosa, no pharyngeal injection or exudates   NECK: Supple   RESPIRATORY: Normal respiratory effort; lungs are clear to auscultation bilaterally  CARDIOVASCULAR: Regular rate and rhythm, normal S1 and S2, no murmur   ABDOMEN: Nontender to palpation, normoactive bowel sounds   MUSCULOSKELETAL: no clubbing or cyanosis of digits; no joint swelling or tenderness to palpation  PSYCH: A+O to person, place, and time; affect appropriate  NEUROLOGY: no gross sensory deficits   SKIN: No rashes     LABS:                        8.1    11.74 )-----------( 591      ( 13 Apr 2023 07:14 )             26.8     04-12    139  |  108  |  19  ----------------------------<  110<H>  4.3   |  22  |  1.48<H>    Ca    8.5      12 Apr 2023 06:35      Culture - Blood (collected 10 Apr 2023 21:40)  Source: .Blood Blood-Peripheral  Preliminary Report (12 Apr 2023 01:02):    No growth to date.    Culture - Blood (collected 10 Apr 2023 21:39)  Source: .Blood Blood-Venous  Preliminary Report (12 Apr 2023 01:02):    No growth to date.      COVID-19 PCR: NotDetec (11 Apr 2023 12:54)  COVID-19 PCR: NotDetec (09 Apr 2023 10:46)  COVID-19 PCR: NotDetec (07 Apr 2023 12:51)  SARS-CoV-2: NotDetec (07 Apr 2023 07:04)  COVID-19 PCR: Detected (06 Apr 2023 17:51)  COVID-19 PCR: NotDetec (04 Apr 2023 16:19)  SARS-CoV-2: NotDete (31 Mar 2023 03:24)      RADIOLOGY & ADDITIONAL TESTS:  New Imaging Personally Reviewed Today:  New Electrocardiogram Personally Reviewed Today:  Other Results Reviewed Today:   Prior or Outpatient Records Reviewed Today with Summary:    COORDINATION OF CARE:  Consultant Communication and Details of Discussion (where applicable):

## 2023-04-13 NOTE — PROGRESS NOTE ADULT - PROVIDER SPECIALTY LIST ADULT
Gastroenterology
Hospitalist
Gastroenterology
Heme/Onc
Hospitalist
Internal Medicine
Hospitalist
Nephrology
Internal Medicine

## 2023-04-13 NOTE — PROGRESS NOTE ADULT - PROBLEM SELECTOR PROBLEM 1
Uncontrolled   A1c above goal.   + dietary indiscretions.     Medication changes:   Increase Metformin to 1000 mg BID   Start Januvia 100 mg daily     Avoiding [place]- given her frequent falls. Goal is to prevent hypoglycemia.     -- Reviewed goals of therapy are to get the best control we can without hypoglycemia  -- Advised frequent self blood glucose monitoring.  Patient encouraged to document glucose results and bring them to every clinic visit  -- at least BID at alternating times. Send logs PRN. RX sent for new meter   -- Hypoglycemia precautions discussed. Instructed on precautions before driving.    -- Call for Bg repeatedly < 90 or > 180.   -- Close adherence to lifestyle changes recommended.   -- Periodic follow ups for eye evaluations, foot care and dental care suggested.         Fever

## 2023-04-14 LAB
CULTURE RESULTS: SIGNIFICANT CHANGE UP
CULTURE RESULTS: SIGNIFICANT CHANGE UP
SPECIMEN SOURCE: SIGNIFICANT CHANGE UP
SPECIMEN SOURCE: SIGNIFICANT CHANGE UP

## 2023-04-20 PROCEDURE — 82803 BLOOD GASES ANY COMBINATION: CPT

## 2023-04-20 PROCEDURE — 97162 PT EVAL MOD COMPLEX 30 MIN: CPT

## 2023-04-20 PROCEDURE — G1004: CPT

## 2023-04-20 PROCEDURE — U0005: CPT

## 2023-04-20 PROCEDURE — 84466 ASSAY OF TRANSFERRIN: CPT

## 2023-04-20 PROCEDURE — 86803 HEPATITIS C AB TEST: CPT

## 2023-04-20 PROCEDURE — 82330 ASSAY OF CALCIUM: CPT

## 2023-04-20 PROCEDURE — 82746 ASSAY OF FOLIC ACID SERUM: CPT

## 2023-04-20 PROCEDURE — 82435 ASSAY OF BLOOD CHLORIDE: CPT

## 2023-04-20 PROCEDURE — P9016: CPT

## 2023-04-20 PROCEDURE — 82607 VITAMIN B-12: CPT

## 2023-04-20 PROCEDURE — 85045 AUTOMATED RETICULOCYTE COUNT: CPT

## 2023-04-20 PROCEDURE — 83615 LACTATE (LD) (LDH) ENZYME: CPT

## 2023-04-20 PROCEDURE — 83550 IRON BINDING TEST: CPT

## 2023-04-20 PROCEDURE — 99285 EMERGENCY DEPT VISIT HI MDM: CPT

## 2023-04-20 PROCEDURE — 83735 ASSAY OF MAGNESIUM: CPT

## 2023-04-20 PROCEDURE — 85014 HEMATOCRIT: CPT

## 2023-04-20 PROCEDURE — 81001 URINALYSIS AUTO W/SCOPE: CPT

## 2023-04-20 PROCEDURE — 82947 ASSAY GLUCOSE BLOOD QUANT: CPT

## 2023-04-20 PROCEDURE — 94640 AIRWAY INHALATION TREATMENT: CPT

## 2023-04-20 PROCEDURE — 36415 COLL VENOUS BLD VENIPUNCTURE: CPT

## 2023-04-20 PROCEDURE — 87040 BLOOD CULTURE FOR BACTERIA: CPT

## 2023-04-20 PROCEDURE — 86334 IMMUNOFIX E-PHORESIS SERUM: CPT

## 2023-04-20 PROCEDURE — 82570 ASSAY OF URINE CREATININE: CPT

## 2023-04-20 PROCEDURE — 85730 THROMBOPLASTIN TIME PARTIAL: CPT

## 2023-04-20 PROCEDURE — 84484 ASSAY OF TROPONIN QUANT: CPT

## 2023-04-20 PROCEDURE — 80048 BASIC METABOLIC PNL TOTAL CA: CPT

## 2023-04-20 PROCEDURE — 84165 PROTEIN E-PHORESIS SERUM: CPT

## 2023-04-20 PROCEDURE — 97110 THERAPEUTIC EXERCISES: CPT

## 2023-04-20 PROCEDURE — 85018 HEMOGLOBIN: CPT

## 2023-04-20 PROCEDURE — 82652 VIT D 1 25-DIHYDROXY: CPT

## 2023-04-20 PROCEDURE — 84132 ASSAY OF SERUM POTASSIUM: CPT

## 2023-04-20 PROCEDURE — 96374 THER/PROPH/DIAG INJ IV PUSH: CPT

## 2023-04-20 PROCEDURE — 84155 ASSAY OF PROTEIN SERUM: CPT

## 2023-04-20 PROCEDURE — U0003: CPT

## 2023-04-20 PROCEDURE — 93306 TTE W/DOPPLER COMPLETE: CPT

## 2023-04-20 PROCEDURE — 85610 PROTHROMBIN TIME: CPT

## 2023-04-20 PROCEDURE — 84100 ASSAY OF PHOSPHORUS: CPT

## 2023-04-20 PROCEDURE — 85027 COMPLETE CBC AUTOMATED: CPT

## 2023-04-20 PROCEDURE — 71045 X-RAY EXAM CHEST 1 VIEW: CPT

## 2023-04-20 PROCEDURE — 87086 URINE CULTURE/COLONY COUNT: CPT

## 2023-04-20 PROCEDURE — 80053 COMPREHEN METABOLIC PANEL: CPT

## 2023-04-20 PROCEDURE — 86850 RBC ANTIBODY SCREEN: CPT

## 2023-04-20 PROCEDURE — 84295 ASSAY OF SERUM SODIUM: CPT

## 2023-04-20 PROCEDURE — 76775 US EXAM ABDO BACK WALL LIM: CPT

## 2023-04-20 PROCEDURE — 82306 VITAMIN D 25 HYDROXY: CPT

## 2023-04-20 PROCEDURE — 86923 COMPATIBILITY TEST ELECTRIC: CPT

## 2023-04-20 PROCEDURE — 83540 ASSAY OF IRON: CPT

## 2023-04-20 PROCEDURE — 85025 COMPLETE CBC W/AUTO DIFF WBC: CPT

## 2023-04-20 PROCEDURE — 83010 ASSAY OF HAPTOGLOBIN QUANT: CPT

## 2023-04-20 PROCEDURE — 71260 CT THORAX DX C+: CPT | Mod: MG

## 2023-04-20 PROCEDURE — 36430 TRANSFUSION BLD/BLD COMPNT: CPT

## 2023-04-20 PROCEDURE — 86901 BLOOD TYPING SEROLOGIC RH(D): CPT

## 2023-04-20 PROCEDURE — 84300 ASSAY OF URINE SODIUM: CPT

## 2023-04-20 PROCEDURE — 83605 ASSAY OF LACTIC ACID: CPT

## 2023-04-20 PROCEDURE — 86900 BLOOD TYPING SEROLOGIC ABO: CPT

## 2023-04-20 PROCEDURE — 82728 ASSAY OF FERRITIN: CPT

## 2023-04-20 PROCEDURE — 80076 HEPATIC FUNCTION PANEL: CPT

## 2023-04-20 PROCEDURE — 0225U NFCT DS DNA&RNA 21 SARSCOV2: CPT

## 2023-04-20 PROCEDURE — 97116 GAIT TRAINING THERAPY: CPT

## 2023-04-25 PROBLEM — Z00.00 ENCOUNTER FOR PREVENTIVE HEALTH EXAMINATION: Status: ACTIVE | Noted: 2023-04-25

## 2023-05-11 ENCOUNTER — OUTPATIENT (OUTPATIENT)
Dept: OUTPATIENT SERVICES | Facility: HOSPITAL | Age: 73
LOS: 1 days | Discharge: HOME | End: 2023-05-11

## 2023-05-11 DIAGNOSIS — D64.9 ANEMIA, UNSPECIFIED: ICD-10-CM

## 2023-05-20 PROBLEM — D64.9 ANEMIA OF UNKNOWN ETIOLOGY: Status: ACTIVE | Noted: 2023-05-19

## 2023-05-23 ENCOUNTER — APPOINTMENT (OUTPATIENT)
Dept: HEMATOLOGY ONCOLOGY | Facility: CLINIC | Age: 73
End: 2023-05-23

## 2023-05-23 DIAGNOSIS — D64.9 ANEMIA, UNSPECIFIED: ICD-10-CM

## 2025-05-09 ENCOUNTER — NON-APPOINTMENT (OUTPATIENT)
Age: 75
End: 2025-05-09

## (undated) DEVICE — FOLEY HOLDER STATLOCK 2 WAY ADULT

## (undated) DEVICE — IRRIGATOR BIO SHIELD

## (undated) DEVICE — BRUSH COLONOSCOPY CYTOLOGY

## (undated) DEVICE — ELCTR GROUNDING PAD ADULT COVIDIEN

## (undated) DEVICE — SYR LUER LOK 50CC

## (undated) DEVICE — TUBING IV SET GRAVITY 3Y 100" MACRO

## (undated) DEVICE — PACK IV START WITH CHG

## (undated) DEVICE — CLAMP BX HOT RAD JAW 3

## (undated) DEVICE — BITE BLOCK ADULT 20 X 27MM (GREEN)

## (undated) DEVICE — SOL INJ NS 0.9% 500ML 2 PORT

## (undated) DEVICE — CATH IV SAFE BC 20G X 1.16" (PINK)

## (undated) DEVICE — SENSOR O2 FINGER ADULT

## (undated) DEVICE — CATH IV SAFE BC 22G X 1" (BLUE)

## (undated) DEVICE — SUCTION YANKAUER NO CONTROL VENT

## (undated) DEVICE — SYR ALLIANCE II INFLATION 60ML

## (undated) DEVICE — FORCEP RADIAL JAW 4 JUMBO 2.8MM 3.2MM 240CM ORANGE DISP

## (undated) DEVICE — TUBING SUCTION CONN 6FT STERILE

## (undated) DEVICE — POLY TRAP ETRAP

## (undated) DEVICE — BALLOON US ENDO

## (undated) DEVICE — BIOPSY FORCEP RADIAL JAW 4 STANDARD WITH NEEDLE

## (undated) DEVICE — TUBING SUCTION 20FT